# Patient Record
Sex: MALE | Race: WHITE
[De-identification: names, ages, dates, MRNs, and addresses within clinical notes are randomized per-mention and may not be internally consistent; named-entity substitution may affect disease eponyms.]

---

## 2017-09-22 ENCOUNTER — HOSPITAL ENCOUNTER (EMERGENCY)
Dept: HOSPITAL 25 - ED | Age: 32
Discharge: HOME | End: 2017-09-22
Payer: COMMERCIAL

## 2017-09-22 VITALS — DIASTOLIC BLOOD PRESSURE: 69 MMHG | SYSTOLIC BLOOD PRESSURE: 137 MMHG

## 2017-09-22 DIAGNOSIS — R10.9: ICD-10-CM

## 2017-09-22 DIAGNOSIS — R11.2: Primary | ICD-10-CM

## 2017-09-22 DIAGNOSIS — Z98.84: ICD-10-CM

## 2017-09-22 DIAGNOSIS — Z87.891: ICD-10-CM

## 2017-09-22 LAB
ALBUMIN SERPL BCG-MCNC: 4.1 G/DL (ref 3.2–5.2)
ALP SERPL-CCNC: 46 U/L (ref 34–104)
ALT SERPL W P-5'-P-CCNC: 9 U/L (ref 7–52)
AMYLASE SERPL-CCNC: 25 U/L (ref 29–103)
ANION GAP SERPL CALC-SCNC: 6 MMOL/L (ref 2–11)
AST SERPL-CCNC: (no result) U/L (ref 13–39)
BUN SERPL-MCNC: 13 MG/DL (ref 6–24)
BUN/CREAT SERPL: 17.6 (ref 8–20)
CALCIUM SERPL-MCNC: 9.6 MG/DL (ref 8.6–10.3)
CHLORIDE SERPL-SCNC: 104 MMOL/L (ref 101–111)
GLOBULIN SER CALC-MCNC: 2.7 G/DL (ref 2–4)
GLUCOSE SERPL-MCNC: 92 MG/DL (ref 70–100)
HCO3 SERPL-SCNC: 29 MMOL/L (ref 22–32)
HCT VFR BLD AUTO: 40 % (ref 42–52)
HGB BLD-MCNC: 13.6 G/DL (ref 14–18)
LIPASE SERPL-CCNC: 12 U/L (ref 11–82)
MCH RBC QN AUTO: 35 PG (ref 27–31)
MCHC RBC AUTO-ENTMCNC: 34 G/DL (ref 31–36)
MCV RBC AUTO: 101 FL (ref 80–94)
POTASSIUM SERPL-SCNC: (no result) MMOL/L (ref 3.5–5)
PROT SERPL-MCNC: 6.8 G/DL (ref 6.4–8.9)
RBC # BLD AUTO: 3.94 10^6/UL (ref 4–5.4)
SODIUM SERPL-SCNC: 139 MMOL/L (ref 133–145)
WBC # BLD AUTO: 4.8 10^3/UL (ref 3.5–10.8)

## 2017-09-22 PROCEDURE — 85025 COMPLETE CBC W/AUTO DIFF WBC: CPT

## 2017-09-22 PROCEDURE — 86140 C-REACTIVE PROTEIN: CPT

## 2017-09-22 PROCEDURE — 83690 ASSAY OF LIPASE: CPT

## 2017-09-22 PROCEDURE — 36415 COLL VENOUS BLD VENIPUNCTURE: CPT

## 2017-09-22 PROCEDURE — 74177 CT ABD & PELVIS W/CONTRAST: CPT

## 2017-09-22 PROCEDURE — 99284 EMERGENCY DEPT VISIT MOD MDM: CPT

## 2017-09-22 PROCEDURE — 96374 THER/PROPH/DIAG INJ IV PUSH: CPT

## 2017-09-22 PROCEDURE — 83605 ASSAY OF LACTIC ACID: CPT

## 2017-09-22 PROCEDURE — 82150 ASSAY OF AMYLASE: CPT

## 2017-09-22 PROCEDURE — 80053 COMPREHEN METABOLIC PANEL: CPT

## 2017-09-22 PROCEDURE — 96375 TX/PRO/DX INJ NEW DRUG ADDON: CPT

## 2017-09-22 RX ADMIN — SODIUM CHLORIDE ONE MLS/HR: 900 IRRIGANT IRRIGATION at 05:26

## 2017-09-22 RX ADMIN — SODIUM CHLORIDE ONE MLS/HR: 900 IRRIGANT IRRIGATION at 03:06

## 2017-09-22 NOTE — ED
Tereso SANDOVAL Benjamin, scribed for Lance Armendariz MD on 09/22/17 at 0217 .





Abdominal Pain/Male





- HPI Summary


HPI Summary: 





33yo male c/o sudden onset of LLQ for 2 weeks. Pt states that his pain radiates 

and wraps around his left flank. Pt also reports N/V, chills and states that he 

couldnt keep anything down. Pt had few episodes of diarrhea yesterday. Hx of 

diverticulitis 1 month ago and hx of gastric bypass surgery. 





- History of Current Complaint


Chief Complaint: EDAbdPain


Stated Complaint: RIGHT GROIN PAIN


Hx Obtained From: Patient


Onset/Duration: Sudden Onset, Lasting Weeks - 2 weeks, Still Present


Timing: Constant


Severity Initially: Mild


Severity Currently: Mild


Pain Intensity: 5


Pain Scale Used: 0-10 Numeric


Location: Discrete At: LUQ, Discrete At: LLQ


Radiates: Yes


Radiates to: Flank - left


Aggravating Factor(s): Nothing


Alleviating Factor(s): Nothing


Associated Signs And Symptoms: Positive: Nausea, Vomiting, Diarrhea, Other - 

chills





- Allergies/Home Medications


Allergies/Adverse Reactions: 


 Allergies











Allergy/AdvReac Type Severity Reaction Status Date / Time


 


Codeine Allergy  Difficulty Verified 09/22/17 00:24





   Breathing  














PMH/Surg Hx/FS Hx/Imm Hx


Endocrine/Hematology History: 


   Denies: Hx Anticoagulant Therapy, Hx Diabetes, Hx Thyroid Disease


Cardiovascular History: 


   Denies: Hx Congestive Heart Failure, Hx Hypertension, Hx Pacemaker/ICD


Respiratory History: Reports: Hx Asthma - SEASONAL, Hx Sleep Apnea


   Denies: Hx Chronic Obstructive Pulmonary Disease (COPD)


GI History: Reports: Hx Gastroesophageal Reflux Disease - ACID REFLUX, Other GI 

Disorders - HX OF DIVERTICULITIS-PRN PERCOCET FOR ABDOMINAL PAIN


   Denies: Hx Ulcer


 History: 


   Denies: Hx Dialysis, Hx Renal Disease


Musculoskeletal History: 


   Denies: Hx Arthritis


Sensory History: Reports: Hx Contacts or Glasses - GLASSES


   Denies: Hx Hearing Aid


Opthamlomology History: Reports: Hx Contacts or Glasses - GLASSES


Neurological History: Reports: Other Neuro Impairments/Disorders - NARCOLEPSY 

CONTROL WITH ADDERALL


   Denies: Hx Dementia, Hx Seizures


Psychiatric History: Reports: Hx Depression, Other Psychiatric Issues/Disorders 

- NARCOLEPSY


   Denies: Hx Panic Disorder, Hx Substance Abuse





- Surgical History


Surgery Procedure, Year, and Place: BILATERAL MYRINGOTOMY WITH TUBES X 7,.  

CIRCUMCISION AT AGE 9,.  TONSILLECTOMY AND ADENOIDECTOMY AS A CHILD.  

COLONOSCOPY.  LEFT WRIST surgery 9/17/13 - hardware placed.  GASTRIC SLEEVE 

SURGERY NOV 2016


Hx Anesthesia Reactions: No





- Immunization History


Date of Tetanus Vaccine: PT STATES UNSURE


Date of Influenza Vaccine: NONE


Infectious Disease History: No


Infectious Disease History: Reports: Hx Shingles - AS A CHILD


   Denies: Hx Clostridium Difficile, Hx Hepatitis, Hx Human Immunodeficiency 

Virus (HIV), Hx of Known/Suspected MRSA, Hx Tuberculosis, Hx Known/Suspected VRE

, Hx Known/Suspected VRSA, History Other Infectious Disease, Traveled Outside 

the [place] in Last 30 Days





- Family History


Known Family History: 


   Negative: Renal Disease, Seizure Disorder





- Social History


Occupation: Employed Part-time


Lives: Alone


Alcohol Use: Rare


Substance Use Type: Reports: None


Smoking Status (MU): Former Smoker


Type: Cigarettes


Amount Used/How Often: 1 PPD X 10+ YEARS


Have You Smoked in the Last Year: Yes





Review of Systems


Positive: Chills


Eyes: Negative


ENT: Negative


Cardiovascular: Negative


Respiratory: Negative


Positive: Abdominal Pain, Vomiting, Diarrhea, Nausea


Genitourinary: Negative


Positive: no symptoms reported


Musculoskeletal: Negative


Skin: Negative


Neurological: Negative


Psychological: Normal


All Other Systems Reviewed And Are Negative: Yes





Physical Exam





- Summary


Physical Exam Summary: 








The patient is well-nourished in mild acute distress.





The skin is warm and dry and skin color reflects adequate perfusion. Decresed 

skin turgor. 





HEENT:  The head is normocephalic and atraumatic. The pupils are equal and 

reactive. The conjunctivae are clear and without drainage.  Nares are patent 

and without drainage.  Mouth reveals dry mucous membranes and the throat is 

without erythema and exudate.  The external ears are intact. The ear canals are 

patent and without drainage. The tympanic membranes are intact.





Neck is supple with full range of motion and non-tender. There are no carotid 

bruits.  There is no neck vein distension.





Respiratory: Chest is non-tender.  Lungs are clear to auscultation and breath 

sounds are symmetrical and equal.





Cardiovascular: Hear is regular rate and rhythm.  There is no murmur or rub 

auscultated.   There is no peripheral edema and pulses are symmetrical and 

equal.





Abdomen: The abdomen is soft. Marked tenderness in LLQ, and LUQ. No tenderness 

in right sided abdomen.  There are normal bowel sounds heard in all four 

quadrants and there is no organomegaly palpated.





Musculoskeletal: There is no back pain noted.  Extremities are non-tender with 

full range of motion.  There is good capillary refill.  There is no peripheral 

edema or calf tenderness elicited.





Neurological: Patient is alert and oriented to person, place and time.  The 

patient has symmetrical motor strength in all four extremities.  Cranial nerves 

are grossly intact. Deep tendon reflexes are symmetrical and equal in all four 

extremities.





Psychiatric: The patient has an appropriate affect and does not exhibit any 

anxiety or depression. 





Triage Information Reviewed: Yes


Vital Signs On Initial Exam: 


 Initial Vitals











Temp Pulse Resp BP Pulse Ox


 


 97.3 F   68   16   122/64   100 


 


 09/22/17 00:21  09/22/17 00:21  09/22/17 00:21  09/22/17 00:21  09/22/17 00:21











Vital Signs Reviewed: Yes





Diagnostics





- Vital Signs


 Vital Signs











  Temp Pulse Resp BP Pulse Ox


 


 09/22/17 00:21  97.3 F  68  16  122/64  100














- Laboratory


Lab Results: 


 Lab Results











  09/22/17 09/22/17 09/22/17 Range/Units





  02:40 02:40 02:40 


 


WBC   4.8   (3.5-10.8)  10^3/ul


 


RBC   3.94 L   (4.0-5.4)  10^6/ul


 


Hgb   13.6 L   (14.0-18.0)  g/dl


 


Hct   40 L   (42-52)  %


 


MCV   101 H   (80-94)  fL


 


MCH   35 H   (27-31)  pg


 


MCHC   34   (31-36)  g/dl


 


RDW   14   (10.5-15)  %


 


Plt Count   114 L   (150-450)  10^3/ul


 


MPV   9   (7.4-10.4)  um3


 


Neut % (Auto)   66.9   (38-83)  %


 


Lymph % (Auto)   24.7 L   (25-47)  %


 


Mono % (Auto)   5.9   (1-9)  %


 


Eos % (Auto)   1.6   (0-6)  %


 


Baso % (Auto)   0.9   (0-2)  %


 


Absolute Neuts (auto)   3.2   (1.5-7.7)  10^3/ul


 


Absolute Lymphs (auto)   1.2   (1.0-4.8)  10^3/ul


 


Absolute Monos (auto)   0.3   (0-0.8)  10^3/ul


 


Absolute Eos (auto)   0.1   (0-0.6)  10^3/ul


 


Absolute Basos (auto)   0   (0-0.2)  10^3/ul


 


Absolute Nucleated RBC   0.01   10^3/ul


 


Nucleated RBC %   0.1   


 


Sodium  139    (133-145)  mmol/L


 


Potassium  TNP    


 


Chloride  104    (101-111)  mmol/L


 


Carbon Dioxide  29    (22-32)  mmol/L


 


Anion Gap  6    (2-11)  mmol/L


 


BUN  13    (6-24)  mg/dL


 


Creatinine  0.74    (0.67-1.17)  mg/dL


 


Est GFR ( Amer)  156.7    (>60)  


 


Est GFR (Non-Af Amer)  121.8    (>60)  


 


BUN/Creatinine Ratio  17.6    (8-20)  


 


Glucose  92    ()  mg/dL


 


Lactic Acid    0.7  (0.5-2.0)  mmol/L


 


Calcium  9.6    (8.6-10.3)  mg/dL


 


Total Bilirubin  0.70    (0.2-1.0)  mg/dL


 


AST  TNP    


 


ALT  9    (7-52)  U/L


 


Alkaline Phosphatase  46    ()  U/L


 


C-Reactive Protein  5.79 H    (< 5.00)  mg/L


 


Total Protein  6.8    (6.4-8.9)  g/dL


 


Albumin  4.1    (3.2-5.2)  g/dL


 


Globulin  2.7    (2-4)  g/dL


 


Albumin/Globulin Ratio  1.5    (1-3)  


 


Amylase  25 L    ()  U/L


 


Lipase  12    (11.0-82.0)  U/L











Result Diagrams: 


 09/22/17 02:40





 09/22/17 02:40


Lab Statement: Any lab studies that have been ordered have been reviewed, and 

results considered in the medical decision making process.





- CT


  ** CT A/P W


CT Interpretation: No Acute Changes


CT Interpretation Completed By: Radiologist - ED physician has reviewed the 

radiology report and agrees with the finding.





Re-Evaluation





- Re-Evaluation


  ** First Eval


Re-Evaluation Time: 06:18


Comment: Reviewed pt's lab and imaging results. Discussed pt's discharge and 

follow up plans.





Abdominal Pain Fem Course/Dx





- Course


Course Of Treatment: Reviewed pt's medication list and allergies. Blood 

pressure noted.





- Diagnoses


Differential Diagnosis/HQI/PQRI: Bowel Obstruction, Constipation, Diverticulitis

, Pancreatitis, Renal Colic


Provider Diagnoses: 


 Abdominal pain, History of gastric bypass








Discharge





- Discharge Plan


Condition: Stable


Disposition: HOME


Prescriptions: 


oxyCODONE/Acetamin 5/325 MG* [Percocet 5/325 TAB*] 1 tab PO Q6H PRN #20 tab MDD 

4


 PRN Reason: pain


Patient Education Materials:  Abdominal Pain (ED), Oxycodone/Acetaminophen (By 

mouth)


Referrals: 


Michoacano Good MD [Medical Doctor] -  (PLEASE FOLLOW UP WITH DR. GOOD.)


Brina Garcia MD [Primary Care Provider] - 





The documentation as recorded by the Tereso dang Benjamin accurately reflects 

the service I personally performed and the decisions made by me, Lance Armendariz MD.

## 2017-09-22 NOTE — RAD
CLINICAL HISTORY: Left upper quadrant pain, left lower quadrant pain, colitis, small bowel

obstruction



COMPARISON: December 29, 2015



TECHNIQUE: Multiple contiguous axial CT scans were obtained of the abdomen and pelvis

after the administration of intravenous contrast. Coronal and sagittal multiplanar

reformations are submitted for review.  Oral contrast was administered.  Delayed images

were obtained through the abdomen and pelvis.



FINDINGS:    



LUNG BASES: The lung bases are clear.



LIVER: The liver is diffusely low in attenuation compared to the spleen. There are no

focal hepatic parenchymal masses.

BILE DUCTS: There is no intrahepatic or extrahepatic biliary dilatation.

GALLBLADDER: The gallbladder is normal, without pericholecystic inflammatory change.



PANCREAS: The pancreas is normal, without mass or ductal dilatation.

SPLEEN: The spleen is at the upper limits of normal in size, measuring up to 12.6 cm in

length and 5 cm in thickness.



UPPER GI TRACT: Evaluation of the gastrointestinal tract is limited by incomplete gastric

distention. There is postsurgical change to the upper GI tract.

SMALL BOWEL AND MESENTERY: The small bowel is normal in contour, course, and caliber.

There is no obstruction or dilatation.

COLON: There are multiple diverticula of the descending and sigmoid colon. There is no

pericolonic inflammatory change. There is a tubular, vermiform, hollow viscus that is

blind ending, and originates from the cecum, consistent with a normal appendix. There is

no periappendiceal inflammatory change. This is seen on coronal images 32 through 39.



ADRENALS: Normal bilaterally.

KIDNEYS: The kidneys are normal in shape, size, contour, and axis. There is no

hydronephrosis or nephrolithiasis.

BLADDER: The bladder is smooth in contour.



PELVIC ORGANS: The prostate gland is normal. The seminal vesicles are symmetric.



AORTA: The aorta is normal.

IVC: Unremarkable



LYMPH NODES: There is no lymphadenopathy by size criteria.



ABDOMINAL WALL: There is no evidence for abdominal wall hernia.

BONES AND SOFT TISSUES: There are bilateral pars defects at L5. There is mild subcutaneous

edema.

OTHER: There is a trace amount of pelvic ascites.



IMPRESSION:

1.  TRACE PELVIC ASCITES, WITH MILD SUBCUTANEOUS EDEMA.

2.  DIVERTICULOSIS.

3.  POSTSURGICAL CHANGE TO THE UPPER GI TRACT.

4.  FATTY LIVER.

5.  THE SPLEEN IS AT THE UPPER LIMITS OF NORMAL IN SIZE.

6.  BILATERAL SPONDYLOLYSIS OF L5

## 2017-09-30 ENCOUNTER — HOSPITAL ENCOUNTER (EMERGENCY)
Dept: HOSPITAL 25 - ED | Age: 32
Discharge: HOME | End: 2017-09-30
Payer: COMMERCIAL

## 2017-09-30 VITALS — DIASTOLIC BLOOD PRESSURE: 66 MMHG | SYSTOLIC BLOOD PRESSURE: 128 MMHG

## 2017-09-30 DIAGNOSIS — Z88.5: ICD-10-CM

## 2017-09-30 DIAGNOSIS — R10.32: Primary | ICD-10-CM

## 2017-09-30 DIAGNOSIS — Z87.891: ICD-10-CM

## 2017-09-30 DIAGNOSIS — R60.9: ICD-10-CM

## 2017-09-30 DIAGNOSIS — F32.9: ICD-10-CM

## 2017-09-30 DIAGNOSIS — R16.0: ICD-10-CM

## 2017-09-30 DIAGNOSIS — K76.0: ICD-10-CM

## 2017-09-30 DIAGNOSIS — K21.9: ICD-10-CM

## 2017-09-30 DIAGNOSIS — Z98.84: ICD-10-CM

## 2017-09-30 LAB
ALBUMIN SERPL BCG-MCNC: 4 G/DL (ref 3.2–5.2)
ALP SERPL-CCNC: 43 U/L (ref 34–104)
ALT SERPL W P-5'-P-CCNC: 12 U/L (ref 7–52)
ANION GAP SERPL CALC-SCNC: 4 MMOL/L (ref 2–11)
AST SERPL-CCNC: 11 U/L (ref 13–39)
BUN SERPL-MCNC: 13 MG/DL (ref 6–24)
BUN/CREAT SERPL: 16.9 (ref 8–20)
CALCIUM SERPL-MCNC: 9 MG/DL (ref 8.6–10.3)
CHLORIDE SERPL-SCNC: 105 MMOL/L (ref 101–111)
GLOBULIN SER CALC-MCNC: 2.6 G/DL (ref 2–4)
GLUCOSE SERPL-MCNC: 83 MG/DL (ref 70–100)
HCO3 SERPL-SCNC: 29 MMOL/L (ref 22–32)
HCT VFR BLD AUTO: 39 % (ref 42–52)
HGB BLD-MCNC: 13.5 G/DL (ref 14–18)
LIPASE SERPL-CCNC: 12 U/L (ref 11–82)
MCH RBC QN AUTO: 34 PG (ref 27–31)
MCHC RBC AUTO-ENTMCNC: 34 G/DL (ref 31–36)
MCV RBC AUTO: 100 FL (ref 80–94)
POTASSIUM SERPL-SCNC: 4.2 MMOL/L (ref 3.5–5)
PROT SERPL-MCNC: 6.6 G/DL (ref 6.4–8.9)
RBC # BLD AUTO: 3.95 10^6/UL (ref 4–5.4)
SODIUM SERPL-SCNC: 138 MMOL/L (ref 133–145)
WBC # BLD AUTO: 4.3 10^3/UL (ref 3.5–10.8)

## 2017-09-30 PROCEDURE — 74177 CT ABD & PELVIS W/CONTRAST: CPT

## 2017-09-30 PROCEDURE — 83605 ASSAY OF LACTIC ACID: CPT

## 2017-09-30 PROCEDURE — 83690 ASSAY OF LIPASE: CPT

## 2017-09-30 PROCEDURE — 99285 EMERGENCY DEPT VISIT HI MDM: CPT

## 2017-09-30 PROCEDURE — 96375 TX/PRO/DX INJ NEW DRUG ADDON: CPT

## 2017-09-30 PROCEDURE — 36415 COLL VENOUS BLD VENIPUNCTURE: CPT

## 2017-09-30 PROCEDURE — 86140 C-REACTIVE PROTEIN: CPT

## 2017-09-30 PROCEDURE — 96376 TX/PRO/DX INJ SAME DRUG ADON: CPT

## 2017-09-30 PROCEDURE — 96360 HYDRATION IV INFUSION INIT: CPT

## 2017-09-30 PROCEDURE — 85025 COMPLETE CBC W/AUTO DIFF WBC: CPT

## 2017-09-30 PROCEDURE — 80053 COMPREHEN METABOLIC PANEL: CPT

## 2017-09-30 NOTE — RAD
INDICATION: Left lower quadrant pain     



COMPARISON: CT September 20, 2017

 

TECHNIQUE: Axial source images were obtained from the hemidiaphragms to the symphysis

pubis following administration of oral and intravenous contrast.  100 mL Omnipaque 300 was

utilized. Coronal and sagittal reconstructed images were acquired.



Lung bases: The lung bases are clear.



Liver: The liver is enlarged with findings of hepatic steatosis. There are no masses.

There is no ductal dilatation.



Gallbladder: There are no calcified gallstones. There is no evidence of wall thickening or

pericholecystic fluid.



Spleen: The spleen is mildly enlarged. There are no masses.



Pancreas: There is no focal pancreatic mass or ductal dilatation.



Adrenal glands: There is no evidence of adrenal mass.



Kidneys: The kidneys are normal in size and position. There are prompt nephrograms and

there is prompt excretion bilaterally. There are no renal parenchymal masses. There is no

evidence of nephrolithiasis.



Adenopathy: There is no evidence of adenopathy by size criteria.



Fluid collections: There is diffuse subcutaneous and mesenteric edema. This appears

slightly more pronounced



Vessels:There are no significant atherosclerotic changes involving the aorta. There is no

focal aneurysm. The iliac vessels are normal in caliber. The IVC appears normal.



GI tract: There are postoperative changes to the upper GI tract. The small bowel to

include the terminal ileum are normal. The ileocecal valve is normal. The appendix is

normal. There are scattered diverticula of the sigmoid colon but there is no CT evidence

of acute diverticulitis.



Pelvic organs: The prostate and seminal vesicles appear normal



Bladder: There are no bladder masses.



Abdominal and pelvic soft tissues: Subcutaneous edema as noted above.



Osseous structures: There are no acute osseous findings. There is L5 spondylolysis.



Other: None



IMPRESSION:

1.  Hepatomegaly with hepatic steatosis. Mild splenomegaly.

2.  Presumed bariatric surgery.

3.  Scattered diverticula of the sigmoid colon but no CT evidence of acute diverticulitis.

4.  Subcutaneous and mesenteric edema. The significance is uncertain.

## 2017-10-03 ENCOUNTER — HOSPITAL ENCOUNTER (INPATIENT)
Dept: HOSPITAL 25 - ED | Age: 32
LOS: 2 days | Discharge: HOME | DRG: 244 | End: 2017-10-05
Attending: HOSPITALIST | Admitting: HOSPITALIST
Payer: COMMERCIAL

## 2017-10-03 DIAGNOSIS — Z80.9: ICD-10-CM

## 2017-10-03 DIAGNOSIS — I10: ICD-10-CM

## 2017-10-03 DIAGNOSIS — K21.9: ICD-10-CM

## 2017-10-03 DIAGNOSIS — M47.896: ICD-10-CM

## 2017-10-03 DIAGNOSIS — Z83.3: ICD-10-CM

## 2017-10-03 DIAGNOSIS — Z83.79: ICD-10-CM

## 2017-10-03 DIAGNOSIS — G47.419: ICD-10-CM

## 2017-10-03 DIAGNOSIS — K57.32: Primary | ICD-10-CM

## 2017-10-03 DIAGNOSIS — Z87.891: ICD-10-CM

## 2017-10-03 DIAGNOSIS — R00.1: ICD-10-CM

## 2017-10-03 DIAGNOSIS — Z88.6: ICD-10-CM

## 2017-10-03 DIAGNOSIS — Z98.84: ICD-10-CM

## 2017-10-03 DIAGNOSIS — J45.909: ICD-10-CM

## 2017-10-03 DIAGNOSIS — F32.9: ICD-10-CM

## 2017-10-03 DIAGNOSIS — E66.9: ICD-10-CM

## 2017-10-03 DIAGNOSIS — R16.2: ICD-10-CM

## 2017-10-03 DIAGNOSIS — G47.33: ICD-10-CM

## 2017-10-03 DIAGNOSIS — Z88.5: ICD-10-CM

## 2017-10-03 DIAGNOSIS — M43.17: ICD-10-CM

## 2017-10-03 LAB
ALBUMIN SERPL BCG-MCNC: 4.2 G/DL (ref 3.2–5.2)
ALP SERPL-CCNC: 50 U/L (ref 34–104)
ALT SERPL W P-5'-P-CCNC: 14 U/L (ref 7–52)
AMYLASE SERPL-CCNC: 37 U/L (ref 29–103)
ANION GAP SERPL CALC-SCNC: 4 MMOL/L (ref 2–11)
AST SERPL-CCNC: 12 U/L (ref 13–39)
BUN SERPL-MCNC: 13 MG/DL (ref 6–24)
BUN/CREAT SERPL: 18.6 (ref 8–20)
CALCIUM SERPL-MCNC: 9.4 MG/DL (ref 8.6–10.3)
CHLORIDE SERPL-SCNC: 105 MMOL/L (ref 101–111)
CK SERPL-CCNC: 35 U/L (ref 10–223)
GLOBULIN SER CALC-MCNC: 2.5 G/DL (ref 2–4)
GLUCOSE SERPL-MCNC: 101 MG/DL (ref 70–100)
HCO3 SERPL-SCNC: 30 MMOL/L (ref 22–32)
HCT VFR BLD AUTO: 42 % (ref 42–52)
HGB BLD-MCNC: 14.1 G/DL (ref 14–18)
LIPASE SERPL-CCNC: 19 U/L (ref 11–82)
MAGNESIUM SERPL-MCNC: 1.9 MG/DL (ref 1.9–2.7)
MCH RBC QN AUTO: 34 PG (ref 27–31)
MCHC RBC AUTO-ENTMCNC: 34 G/DL (ref 31–36)
MCV RBC AUTO: 102 FL (ref 80–94)
POTASSIUM SERPL-SCNC: 4 MMOL/L (ref 3.5–5)
PROT SERPL-MCNC: 6.7 G/DL (ref 6.4–8.9)
RBC # BLD AUTO: 4.11 10^6/UL (ref 4–5.4)
SODIUM SERPL-SCNC: 139 MMOL/L (ref 133–145)
WBC # BLD AUTO: 3.6 10^3/UL (ref 3.5–10.8)

## 2017-10-03 PROCEDURE — 85610 PROTHROMBIN TIME: CPT

## 2017-10-03 PROCEDURE — 81003 URINALYSIS AUTO W/O SCOPE: CPT

## 2017-10-03 PROCEDURE — 86140 C-REACTIVE PROTEIN: CPT

## 2017-10-03 PROCEDURE — 82150 ASSAY OF AMYLASE: CPT

## 2017-10-03 PROCEDURE — 87040 BLOOD CULTURE FOR BACTERIA: CPT

## 2017-10-03 PROCEDURE — 80048 BASIC METABOLIC PNL TOTAL CA: CPT

## 2017-10-03 PROCEDURE — 85025 COMPLETE CBC W/AUTO DIFF WBC: CPT

## 2017-10-03 PROCEDURE — 83690 ASSAY OF LIPASE: CPT

## 2017-10-03 PROCEDURE — 83605 ASSAY OF LACTIC ACID: CPT

## 2017-10-03 PROCEDURE — 93005 ELECTROCARDIOGRAM TRACING: CPT

## 2017-10-03 PROCEDURE — G0378 HOSPITAL OBSERVATION PER HR: HCPCS

## 2017-10-03 PROCEDURE — 80053 COMPREHEN METABOLIC PANEL: CPT

## 2017-10-03 PROCEDURE — 74177 CT ABD & PELVIS W/CONTRAST: CPT

## 2017-10-03 PROCEDURE — 83735 ASSAY OF MAGNESIUM: CPT

## 2017-10-03 PROCEDURE — 82550 ASSAY OF CK (CPK): CPT

## 2017-10-03 PROCEDURE — 36415 COLL VENOUS BLD VENIPUNCTURE: CPT

## 2017-10-03 RX ADMIN — HEPARIN SODIUM SCH UNITS: 5000 INJECTION INTRAVENOUS; SUBCUTANEOUS at 22:08

## 2017-10-03 RX ADMIN — OXYCODONE HYDROCHLORIDE AND ACETAMINOPHEN PRN TAB: 5; 325 TABLET ORAL at 20:05

## 2017-10-03 RX ADMIN — HYDROMORPHONE HYDROCHLORIDE PRN MG: 1 INJECTION, SOLUTION INTRAMUSCULAR; INTRAVENOUS; SUBCUTANEOUS at 22:07

## 2017-10-03 RX ADMIN — ONDANSETRON PRN MG: 2 INJECTION INTRAMUSCULAR; INTRAVENOUS at 17:27

## 2017-10-03 RX ADMIN — SODIUM CHLORIDE SCH MLS/HR: 900 IRRIGANT IRRIGATION at 14:59

## 2017-10-03 RX ADMIN — HYDROMORPHONE HYDROCHLORIDE PRN MG: 1 INJECTION, SOLUTION INTRAMUSCULAR; INTRAVENOUS; SUBCUTANEOUS at 17:27

## 2017-10-03 RX ADMIN — ONDANSETRON PRN MG: 2 INJECTION INTRAMUSCULAR; INTRAVENOUS at 23:10

## 2017-10-03 RX ADMIN — PIPERACILLIN AND TAZOBACTAM SCH MLS/HR: 3; .375 INJECTION, POWDER, LYOPHILIZED, FOR SOLUTION INTRAVENOUS; PARENTERAL at 16:14

## 2017-10-03 RX ADMIN — HEPARIN SODIUM SCH: 5000 INJECTION INTRAVENOUS; SUBCUTANEOUS at 14:27

## 2017-10-03 RX ADMIN — OXYCODONE HYDROCHLORIDE AND ACETAMINOPHEN PRN TAB: 5; 325 TABLET ORAL at 14:51

## 2017-10-03 RX ADMIN — HYDROMORPHONE HYDROCHLORIDE PRN MG: 1 INJECTION, SOLUTION INTRAMUSCULAR; INTRAVENOUS; SUBCUTANEOUS at 12:14

## 2017-10-03 RX ADMIN — DEXTROAMPHETAMINE SACCHARATE, AMPHETAMINE ASPARTATE, DEXTROAMPHETAMINE SULFATE AND AMPHETAMINE SULFATE SCH MG: 2.5; 2.5; 2.5; 2.5 TABLET ORAL at 16:14

## 2017-10-03 NOTE — ED
Damián SANDOVAL Angela, scribed for Dennis Cat MD on 10/03/17 at 0746 .





Abdominal Pain/Male





- HPI Summary


HPI Summary: 





This pt is a 31 y[facility]. Pt 

reports he was in the ED 3 days ago, where he had a CT done and was discharged 

home with Cipro and Flagyl for diverticulitis. Pt notes his abd pain starts in 

his umbilicus and radiates to his left abd. Pt additionally c/o nausea and 

diarrhea. Pt notes he had a fever last night at 2100 and experienced chills. Pt 

denies tobacco, alcohol or drug use. He works in [employer] in the [email or web address] 

department. PMhx: diverticulitis. PSHx: gastric sleeve surgery in Nov. 2016.





- History of Current Complaint


Chief Complaint: EDAbdPain


Stated Complaint: ABD PAIN


Time Seen by Provider: 10/03/17 07:15


Hx Obtained From: Patient


Onset/Duration: Lasting Hours


Timing: Lasting Hours


Pain Intensity: 9


Pain Scale Used: 0-10 Numeric


Location: Umbilical


Radiates: Yes


Radiates to: LLQ - left sided


Associated Signs And Symptoms: Positive: Fever, Back Pain, Nausea, Diarrhea.  

Negative: Chest Pain, Constipation





- Allergies/Home Medications


Allergies/Adverse Reactions: 


 Allergies











Allergy/AdvReac Type Severity Reaction Status Date / Time


 


Codeine Allergy Severe Difficulty Verified 10/03/17 05:30





   Breathing  


 


NSAIDs Allergy Severe GI Upset Verified 10/03/17 05:30














PMH/Surg Hx/FS Hx/Imm Hx


Endocrine/Hematology History: 


   Denies: Hx Anticoagulant Therapy, Hx Diabetes, Hx Thyroid Disease


Cardiovascular History: 


   Denies: Hx Congestive Heart Failure, Hx Hypertension, Hx Pacemaker/ICD


Respiratory History: Reports: Hx Asthma - SEASONAL, Hx Sleep Apnea


   Denies: Hx Chronic Obstructive Pulmonary Disease (COPD)


GI History: Reports: Hx Gastroesophageal Reflux Disease - ACID REFLUX, Other GI 

Disorders - HX OF DIVERTICULITIS-PRN PERCOCET FOR ABDOMINAL PAIN


   Denies: Hx Ulcer


 History: 


   Denies: Hx Dialysis, Hx Renal Disease


Musculoskeletal History: 


   Denies: Hx Arthritis


Sensory History: Reports: Hx Contacts or Glasses - GLASSES


   Denies: Hx Hearing Aid


Opthamlomology History: Reports: Hx Contacts or Glasses - GLASSES


Neurological History: Reports: Other Neuro Impairments/Disorders - NARCOLEPSY 

CONTROL WITH ADDERALL


   Denies: Hx Dementia, Hx Seizures


Psychiatric History: Reports: Hx Depression, Other Psychiatric Issues/Disorders 

- NARCOLEPSY


   Denies: Hx Panic Disorder, Hx Substance Abuse





- Surgical History


Surgery Procedure, Year, and Place: BILATERAL MYRINGOTOMY WITH TUBES X 7,.  

CIRCUMCISION AT AGE 9,.  TONSILLECTOMY AND ADENOIDECTOMY AS A CHILD.  

COLONOSCOPY.  LEFT WRIST surgery 9/17/13 - hardware placed.  GASTRIC SLEEVE 

SURGERY NOV 2016


Hx Anesthesia Reactions: No





- Immunization History


Date of Tetanus Vaccine: PT STATES UNSURE


Date of Influenza Vaccine: NONE


Infectious Disease History: No


Infectious Disease History: Reports: Hx Shingles - AS A CHILD


   Denies: Hx Clostridium Difficile, Hx Hepatitis, Hx Human Immunodeficiency 

Virus (HIV), Hx of Known/Suspected MRSA, Hx Tuberculosis, Hx Known/Suspected VRE

, Hx Known/Suspected VRSA, History Other Infectious Disease, Traveled Outside 

the [place] in Last 30 Days





- Family History


Known Family History: Positive: Diabetes - Father, Other - Father: 

diverticulitis, Crohn's


   Negative: Renal Disease, Seizure Disorder





- Social History


Occupation: Employed Full-time - [employer]


Alcohol Use: Rare


Substance Use Type: Reports: None


Smoking Status (MU): Former Smoker


Type: Cigarettes


Amount Used/How Often: 1 PPD X 10+ YEARS


Have You Smoked in the Last Year: Yes





Review of Systems


Negative: Fever, Chills


Negative: Palpitations, Chest Pain


Negative: Shortness Of Breath


Positive: Abdominal Pain - left sided, Diarrhea, Nausea.  Negative: Vomiting


All Other Systems Reviewed And Are Negative: Yes





Physical Exam


Triage Information Reviewed: Yes


Vital Signs On Initial Exam: 


 Initial Vitals











Temp Pulse Resp BP Pulse Ox


 


 97.6 F   56   16   130/82   98 


 


 10/03/17 04:54  10/03/17 04:54  10/03/17 04:54  10/03/17 04:54  10/03/17 04:54











Vital Signs Reviewed: Yes


Appearance: Positive: Well-Appearing, No Pain Distress


Skin: Positive: Skin Color Reflects Adequate Perfusion


Head/Face: Positive: Normal Head/Face Inspection


Eyes: Positive: EOMI


Neck: Positive: Supple, Nontender


Respiratory/Lung Sounds: Positive: Clear to Auscultation, Breath Sounds Present


Cardiovascular: Positive: RRR.  Negative: Murmur


Abdomen Description: Positive: Other: - tender across the anterior abdominal 

wall


Musculoskeletal: Positive: Strength/ROM Intact


Neurological: Positive: Sensory/Motor Intact, Alert, Oriented to Person Place, 

Time, CN Intact II-III


Psychiatric: Positive: Normal





- Danielle Coma Scale


Best Eye Response: 4 - Spontaneous


Best Motor Response: 6 - Obeys Commands


Best Verbal Response: 5 - Oriented


Coma Scale Total: 15





Diagnostics





- Vital Signs


 Vital Signs











  Temp Pulse Resp BP Pulse Ox


 


 10/03/17 04:54  97.6 F  56  16  130/82  98














- Laboratory


Result Diagrams: 


 10/03/17 08:03





 10/03/17 08:03


Lab Statement: Any lab studies that have been ordered have been reviewed, and 

results considered in the medical decision making process.





- CT


  ** Abd/Pel CT


CT Interpretation: Positive (See Comments) - IMPRESSION:   1. MILD THICKENING 

OF THE WALL OF THE PROXIMAL SIGMOID COLON DIFFERENTIAL DIAGNOSIS WOULD INCLUDE 

MUSCULAR HYPERTROPHY FROM SEVERE SIGMOID DIVERTICULOSIS, MILD DIVERTICULITIS OR 

COLITIS, LESS LIKELY A COLON MASS. 2. SUBCUTANEOUS AND MESENTERIC EDEMA, 

UNCHANGED. 3. HEPATOSPLENOMEGALY, UNCHANGED. 4. STATUS POST GASTRIC SLEEVE 

PROCEDURE. 5. MILD GRADE I ANTERIOR SPONDYLOLISTHESIS AT THE L5-S1 LEVEL AND 

BILATERAL SPONDYLOLYSIS AT THE L5 LEVEL, UNCHANGED. ED physician has reviewed 

this radiology report and agrees.


CT Interpretation Completed By: Radiologist





Abdominal Pain Fem Course/Dx





- Course


Course Of Treatment: 32 yr old male who has not improved onoutpaitent 

treatment.  Admit for IV antibiotics.





- Diagnoses


Provider Diagnoses: 


 Diverticulitis large intestine, Abdominal pain








- Provider Notifications


Discussed Care Of Patient With: Stephanie Beth


Time Discussed With Above Provider: 11:25





Discharge





- Discharge Plan


Condition: Good


Disposition: ADMITTED TO [facility]





The documentation as recorded by the Damián dang Angela accurately reflects 

the service I personally performed and the decisions made by Emory spears Walter, MD.

## 2017-10-03 NOTE — RAD
INDICATION:  Persistent abdominal pain worsening.



COMPARISON:  Comparison is made with prior studies from September 22, 2017 and September 30, 2017.



TECHNIQUE: A CT scan of the abdomen and pelvis was performed with intravenous and oral

contrast following intravenous injection of 117 ml of  Omnipaque 300 nonionic contrast.

Contiguous axial sections were obtained from the lung bases through the symphysis pubis.

Images were reconstructed in the coronal and sagittal planes.



FINDINGS:  The lung bases are clear.  No pleural effusion is present.



The liver and spleen are moderately enlarged without significant focal abnormality and

unchanged from the prior study. No calcified gallstones are seen. The pancreas appears to

be within normal limits. 



The kidneys and adrenal glands are normal in size. No hydronephrosis is seen.  No

significant focal renal abnormality is seen.



The aorta is normal in caliber and demonstrates homogeneous contrast opacification.



No significant enlarged retroperitoneal lymph nodes are seen.



The patient is status post gastric sleeve procedure. The stomach, small and large bowel

appear nondistended. The appendix is within normal limits.  There is moderate to severe

descending and sigmoid diverticulosis. There is mild thickening of the wall of the

proximal sigmoid colon appears similar to the prior study which may represent muscular

hypertrophy although mild diverticulitis be excluded. There is mild stranding within the

mesenteric fat and within the subcutaneous tissues which appears unchanged from the prior

study.



No free intraperitoneal air or fluid is seen.



There is minimal grade I anterior spondylolisthesis at the L5-S1 level and bilateral

spondylolysis at the L5 level. No other focal osseous abnormalities are seen.



IMPRESSION:  

1. MILD THICKENING OF THE WALL OF THE PROXIMAL SIGMOID COLON DIFFERENTIAL DIAGNOSIS WOULD

INCLUDE MUSCULAR HYPERTROPHY FROM SEVERE SIGMOID DIVERTICULOSIS, MILD DIVERTICULITIS OR

COLITIS, LESS LIKELY A COLON MASS.

2. SUBCUTANEOUS AND MESENTERIC EDEMA, UNCHANGED.

3. HEPATOSPLENOMEGALY, UNCHANGED.

4. STATUS POST GASTRIC SLEEVE PROCEDURE.

5. MILD GRADE I ANTERIOR SPONDYLOLISTHESIS AT THE L5-S1 LEVEL AND BILATERAL SPONDYLOLYSIS

AT THE L5 LEVEL, UNCHANGED.

## 2017-10-03 NOTE — HP
CC:  Dr. Good *

 

HISTORY AND PHYSICAL:

 

DATE OF ADMISSION:  10/03/17

 

ATTENDING PHYSICIAN WHILE IN THE HOSPITAL:  Jacky Alvarez MD* (report dictated 
by Tyrese Jacob NP)

 

CHIEF COMPLAINT:  Left lower quadrant abdominal pain.

 

HISTORY OF PRESENTING ILLNESS:  Mr. Royal is a 32-year-old male patient who 
has had diverticulitis multiple times, he states it has been too numerous to 
count.  He is actually set up for sigmoid colon resection with Dr. Good in 
about a month's time.  He came in today, he stated on Thursday and Friday he 
started developing left lower colicky type burning, stabbing pain similar to 
his diverticulitis.  He came into the ER on Saturday, was prescribed 
antibiotics.  However, despite the antibiotics being on board for about 36 hours
, he still spiked a fever.  He was nauseous on Sunday. His pain was getting 
worse yesterday, progressively got worse today.  He was concerned and decided 
to come into the ER.  He says he has not been vomiting but he says he has been 
feeling very nauseous, having hard time taking his medications.  He says his 
pain has been well controlled and he described it as burning, sharp, stabbing 
pain in the left lower quadrant that comes and goes and there has been no 
association with food.  He does state he has been having some diarrhea.  He 
denies having any blood in the urine or in the stool and denies having any 
urgency, frequency or hesitancy.  He came into the ED, there was concern 
because of the diverticulitis and we were asked to evaluate for admission.

 

PAST MEDICAL HISTORY:  Significant for,

1.  Obesity.

2.  Hypertension.

3.  LUDA.

4.  Narcolepsy.

5.  Diverticulitis and diverticulosis.

 

PAST SURGICAL HISTORY:  He has had a gastric sleeve about a year ago.  He has 
lost 200 pounds.

 

HOME MEDICATIONS:  According to the list that he provided and includes,

 

1.  Percocet one tablet every 4 to 6 hours as needed.

2.  Zofran 4 mg every 6 hours as needed.

3.  Flagyl 500 mg p.o. t.i.d.

4.  Cipro 500 mg p.o. b.i.d.

5.  Adderall 20 mg p.o. t.i.d.

 

ALLERGIES TO MEDICATIONS:  CODEINE and NSAIDs.

 

FAMILY HISTORY:  His mother had cancer.  His father had history of 
diverticulitis, Crohn's probable and diabetes.

 

SOCIAL HISTORY:  He is a former smoker, he quit about a year ago.  He does not 
drink alcohol.  He does not wish to appoint a surrogate decision maker at this 
point.

 

REVIEW OF SYSTEMS:  There was fever at home but none documented here.  He 
denied any significant weight change.  There was no double vision.  He denies 
having any ear discharge.  There was no rhinorrhea.  No sore throat.  No 
thyroid enlargement. There was no chest pain, orthopnea or nocturnal dyspnea.  
Abdominal pain per my HPI.  There was nausea but no vomiting.  No dysuria.  No 
frequency.  No seizure. No loss of consciousness.  No pruritus and no skin 
ulcerations.  Review of 14 systems completed, all others negative.

 

                               PHYSICAL EXAMINATION

 

GENERAL:  At this time, Mr. Royal is a 32-year-old male patient.  He is 
sitting in the ER stretcher.  He does not appear to be in any acute distress.

 

VITAL SIGNS:  Blood pressure 128/78, pulse of 63, respirations 16, O2 sat 98%, 
temperature 97.6.

 

HEENT:  Head:  Atraumatic and normocephalic.  Eyes:  EOMs are intact.  Sclerae 
anicteric and not pale.  Throat:  Oral mucosa appears to be dry.  No 
oropharyngeal erythema.

 

NECK:  Supple.

 

LUNGS:  Clear to auscultation bilaterally.  No wheezes, rales or rhonchi.

 

HEART:  Sounds S1 and S2.  Regular rate and rhythm.  No murmurs, rubs, or 
gallops.

 

ABDOMEN:  Soft, flat, tenderness in the left lower quadrant.  Bowel sounds 
present.

 

EXTREMITIES:  Pulses are 2+ throughout.  He is able to move all 4 extremities 
with 5/5 strength.

 

NEUROLOGIC:  The patient is awake, alert and oriented x3.  Tongue midline.  
 are equal.  No gross focal deficits.

 

SKIN:  Grossly intact.

 

 DIAGNOSTIC STUDIES/LAB DATA:  Revealed WBC of 3.6, RBC of 4.01, hemoglobin 14.1
, hematocrit of 42, platelet count of 125.  Sodium 139, potassium 4.0, chloride 
105, bicarb 30, BUN 13, creatinine 0.70, glucose 101, lactic 0.8, calcium 9.4, 
mag 1.9. Total bili 0.5, AST 12, ALT 14, alk phos 50.  CRP was 2.6.  Albumin 
was 4.2. Amylase and lipase normal.  He did have abdominal and pelvis CT 
obtained today which impression read, mild thickening of the wall of the 
proximal sigmoid colon, differential would include muscular hypertrophy from 
severe sigmoid diverticulosis, mild diverticulitis or colitis, less likely a 
colon mass, subcutaneous and mesenteric edema unchanged, hepatosplenomegaly 
unchanged, status post gastric sleeve procedure, mild grade 1 anterior 
spondylolisthesis at L5-S1 and bilateral spondylolisthesis at L5 level 
unchanged.  Old medical records were reviewed.

 

ASSESSMENT AND PLAN:  Mr. Royal is a 32-year-old male patient coming into the 
ER today with complaints of abdominal pain, also complaints of nausea, fever, 
and chills last night despite being on appropriate p.o. antibiotics.  The 
patient was evaluated in the ER today.  There is concern for recurrence of 
diverticulitis, we are asked to evaluate for admission.  He will be admitted 
under observation status for:

 

1.  Diverticulitis.  At this point, the patient has had several bouts of 
diverticulitis.  He has a followup with Dr. Good for sigmoid partial colectomy 
with Dr. Good next month.  The plan at this point, we will go ahead and give 
him IV fluids, p.r.n. Zofran, p.r.n. pain medications, put him on Zosyn and 
continue to follow closely.

2.  Hypertension.  We will monitor his blood pressure.  It has been resolved 
since he has lost 200 pounds.

3.  Obesity.  He is to continue with his current medical regimen.

4.  Obstructive sleep apnea.  Not an active issue anymore because of the weight 
loss.

5.  Narcolepsy.  Continue his Adderall.

6.  DVT prophylaxis.  He will be placed on heparin subcu.

7.  Code status.  He is a full code.

8.  Fluids, electrolytes and nutrition:  He can have a clear diet.

 

TIME SPENT:  Time spent on the admission was 60 minutes, greater than half the 
time was spent face-to-face with the patient obtaining my history and physical, 
the other half of the time was spent going over the plan of care with the 
patient and implementing the plan of care.

 

I did discuss the plan of care with my attending Dr. Alvarez, she is in 
agreement.

 

 ____________________________________ TYRESECHANEL JACOB NP

 

[id number]/[facility] #: [id number]

ASTRID

## 2017-10-04 LAB
ANION GAP SERPL CALC-SCNC: 2 MMOL/L (ref 2–11)
BUN SERPL-MCNC: 9 MG/DL (ref 6–24)
BUN/CREAT SERPL: 11.5 (ref 8–20)
CALCIUM SERPL-MCNC: 8.7 MG/DL (ref 8.6–10.3)
CHLORIDE SERPL-SCNC: 107 MMOL/L (ref 101–111)
GLUCOSE SERPL-MCNC: 89 MG/DL (ref 70–100)
HCO3 SERPL-SCNC: 28 MMOL/L (ref 22–32)
HCT VFR BLD AUTO: 36 % (ref 42–52)
HGB BLD-MCNC: 12 G/DL (ref 14–18)
MCH RBC QN AUTO: 34 PG (ref 27–31)
MCHC RBC AUTO-ENTMCNC: 34 G/DL (ref 31–36)
MCV RBC AUTO: 101 FL (ref 80–94)
POTASSIUM SERPL-SCNC: 4.2 MMOL/L (ref 3.5–5)
RBC # BLD AUTO: 3.52 10^6/UL (ref 4–5.4)
SODIUM SERPL-SCNC: 137 MMOL/L (ref 133–145)
WBC # BLD AUTO: 4.3 10^3/UL (ref 3.5–10.8)

## 2017-10-04 RX ADMIN — HYDROMORPHONE HYDROCHLORIDE PRN MG: 1 INJECTION, SOLUTION INTRAMUSCULAR; INTRAVENOUS; SUBCUTANEOUS at 02:08

## 2017-10-04 RX ADMIN — OXYCODONE HYDROCHLORIDE AND ACETAMINOPHEN PRN TAB: 5; 325 TABLET ORAL at 16:44

## 2017-10-04 RX ADMIN — ONDANSETRON PRN MG: 2 INJECTION INTRAMUSCULAR; INTRAVENOUS at 22:00

## 2017-10-04 RX ADMIN — HEPARIN SODIUM SCH UNITS: 5000 INJECTION INTRAVENOUS; SUBCUTANEOUS at 14:58

## 2017-10-04 RX ADMIN — DEXTROAMPHETAMINE SACCHARATE, AMPHETAMINE ASPARTATE, DEXTROAMPHETAMINE SULFATE AND AMPHETAMINE SULFATE SCH MG: 2.5; 2.5; 2.5; 2.5 TABLET ORAL at 08:27

## 2017-10-04 RX ADMIN — DEXTROAMPHETAMINE SACCHARATE, AMPHETAMINE ASPARTATE, DEXTROAMPHETAMINE SULFATE AND AMPHETAMINE SULFATE SCH MG: 2.5; 2.5; 2.5; 2.5 TABLET ORAL at 16:46

## 2017-10-04 RX ADMIN — HEPARIN SODIUM SCH UNITS: 5000 INJECTION INTRAVENOUS; SUBCUTANEOUS at 05:10

## 2017-10-04 RX ADMIN — DEXTROAMPHETAMINE SACCHARATE, AMPHETAMINE ASPARTATE, DEXTROAMPHETAMINE SULFATE AND AMPHETAMINE SULFATE SCH MG: 2.5; 2.5; 2.5; 2.5 TABLET ORAL at 12:19

## 2017-10-04 RX ADMIN — OXYCODONE HYDROCHLORIDE AND ACETAMINOPHEN PRN TAB: 5; 325 TABLET ORAL at 00:19

## 2017-10-04 RX ADMIN — ONDANSETRON PRN MG: 2 INJECTION INTRAMUSCULAR; INTRAVENOUS at 07:15

## 2017-10-04 RX ADMIN — OXYCODONE HYDROCHLORIDE AND ACETAMINOPHEN PRN TAB: 5; 325 TABLET ORAL at 21:59

## 2017-10-04 RX ADMIN — PIPERACILLIN AND TAZOBACTAM SCH MLS/HR: 3; .375 INJECTION, POWDER, LYOPHILIZED, FOR SOLUTION INTRAVENOUS; PARENTERAL at 00:19

## 2017-10-04 RX ADMIN — SODIUM CHLORIDE SCH MLS/HR: 900 IRRIGANT IRRIGATION at 01:12

## 2017-10-04 RX ADMIN — PIPERACILLIN AND TAZOBACTAM SCH MLS/HR: 3; .375 INJECTION, POWDER, LYOPHILIZED, FOR SOLUTION INTRAVENOUS; PARENTERAL at 16:37

## 2017-10-04 RX ADMIN — OXYCODONE HYDROCHLORIDE AND ACETAMINOPHEN PRN TAB: 5; 325 TABLET ORAL at 12:19

## 2017-10-04 RX ADMIN — PIPERACILLIN AND TAZOBACTAM SCH MLS/HR: 3; .375 INJECTION, POWDER, LYOPHILIZED, FOR SOLUTION INTRAVENOUS; PARENTERAL at 08:27

## 2017-10-04 NOTE — PN
Subjective


Date of Service: 10/04/17


Interval History: 





Patient seen this morning. Complaining of persistent pain. Says he usually gets 

bouts of diverticulitis and they are managed as an outpatient with oral ABx and 

pain medications but it did not help this time when discharged from ED with 

these meds a few days ago. Patient was discharged recently from Dr. Garcia's 

office which he states was for too many no-shows. On iStop review patient had 

been prescribed percocet by Dr. Garcia which the patient states was for 

diverticulitis although he seemed to be on percocet continuously for months, 

possible for back pain. Last rx from Dr. Garcia was a 10 day supply filled on 9/13

, he has also a few shorter courses prescribed recently by our ED docs and a 

dentist in [place]. 


Family History: Unchanged from Admission


Social History: Unchanged from Admission


Past Medical History: Unchanged from Admission





Objective


Active Medications: 








Acetaminophen (Tylenol Tab*)  650 mg PO Q4H PRN


Amphetamine/Dextroamphetamine (Adderall Tab*)  20 mg PO 0800,1200,1600 NILESH


Heparin Sodium (Porcine) (Heparin Vial(*))  5,000 units SUBCUT Q8HR NILESH


Hydromorphone HCl (Dilaudid Inj*)  0.5 mg IV SLOW PU Q4H PRN


Sodium Chloride (Ns 0.9% 1000 Ml*)  1,000 mls @ 125 mls/hr IV PER RATE NILESH


Piperacillin Sod/Tazobactam (Sod 3.375 gm/ Sodium Chloride)  100 mls @ 25 mls/

hr IVPB Q8H NILESH


Ondansetron HCl (Zofran Inj*)  4 mg IV Q6H PRN


Oxycodone/Acetaminophen (Percocet 5/325 Tab*)  1 tab PO Q4H PRN


Pharmacy Consult (Zosyn Per Pharmacy*)  1 note FOLLOW UP .ZOSYN PER PHARMACY [facility]





 Vital Signs











  10/03/17 10/03/17 10/03/17





  12:14 13:08 13:10


 


Temperature   


 


Pulse Rate   48


 


Respiratory 16  





Rate   


 


Blood Pressure  113/65 





(mmHg)   


 


O2 Sat by Pulse   99





Oximetry   














  10/04/17 10/04/17 10/04/17





  04:29 05:24 07:25


 


Temperature   97.3 F


 


Pulse Rate 44  48


 


Respiratory  14 18





Rate   


 


Blood Pressure   116/75





(mmHg)   


 


O2 Sat by Pulse   100





Oximetry   











Oxygen Devices in Use Now: None


Appearance: Young,  M, laying in bed in NAD


Eyes: No Scleral Icterus


Ears/Nose/Mouth/Throat: Mucous Membranes Moist


Neck: NL Appearance and Movements; NL JVP


Respiratory: Symmetrical Chest Expansion and Respiratory Effort, Clear to 

Auscultation


Cardiovascular: NL Sounds; No Murmurs; No JVD, - - bradycardia


Abdominal: - - Soft, non-distended, TTP in LLQ, BS+


Lymphatic: No Cervical Adenopathy


Extremities: No Edema


Skin: No Rash or Ulcers


Neurological: Alert and Oriented x 3


Result Diagrams: 


 10/04/17 05:33





 10/04/17 05:33





Assess/Plan/Problems-Billing


Assessment: 


Diverticulitis that has been unresponsive to outpatient PO ABx/narcotics in a 

31 yo M with hx of obesity s/p gastric sleeve 11/2016, diverticulosis and 

recurrent diverticulitis with planned sigmoid colectomy








- Patient Problems


(1) Diverticulitis


Current Visit: Yes   Comment: CT scan shows some evidence of mild 

diverticulitis. For now will keep on IV Zosyn. Concerned that there may be a 

component of drug-seeking. Will attempt to advance diet today. Has been 

bradycardic after IV dilaudid, will hold while HRs low. EKG shows sinus mitra. 

Is planned to have sigmoid colectomy by Dr. Good next month. Will attempt to 

get in touch with Dr. Garcia and Dr. Good   





(2) Narcolepsy


Current Visit: Yes   Comment: Continue adderall   





(3) DVT prophylaxis


Current Visit: Yes   Comment: HSQ

## 2017-10-05 VITALS — DIASTOLIC BLOOD PRESSURE: 71 MMHG | SYSTOLIC BLOOD PRESSURE: 117 MMHG

## 2017-10-05 RX ADMIN — DEXTROAMPHETAMINE SACCHARATE, AMPHETAMINE ASPARTATE, DEXTROAMPHETAMINE SULFATE AND AMPHETAMINE SULFATE SCH MG: 2.5; 2.5; 2.5; 2.5 TABLET ORAL at 08:09

## 2017-10-05 RX ADMIN — OXYCODONE HYDROCHLORIDE AND ACETAMINOPHEN PRN TAB: 5; 325 TABLET ORAL at 12:10

## 2017-10-05 RX ADMIN — HEPARIN SODIUM SCH UNITS: 5000 INJECTION INTRAVENOUS; SUBCUTANEOUS at 06:43

## 2017-10-05 RX ADMIN — DEXTROAMPHETAMINE SACCHARATE, AMPHETAMINE ASPARTATE, DEXTROAMPHETAMINE SULFATE AND AMPHETAMINE SULFATE SCH MG: 2.5; 2.5; 2.5; 2.5 TABLET ORAL at 12:10

## 2017-10-05 RX ADMIN — PIPERACILLIN AND TAZOBACTAM SCH MLS/HR: 3; .375 INJECTION, POWDER, LYOPHILIZED, FOR SOLUTION INTRAVENOUS; PARENTERAL at 01:00

## 2017-10-05 RX ADMIN — PIPERACILLIN AND TAZOBACTAM SCH MLS/HR: 3; .375 INJECTION, POWDER, LYOPHILIZED, FOR SOLUTION INTRAVENOUS; PARENTERAL at 08:00

## 2017-10-05 RX ADMIN — HEPARIN SODIUM SCH UNITS: 5000 INJECTION INTRAVENOUS; SUBCUTANEOUS at 01:00

## 2017-10-05 RX ADMIN — OXYCODONE HYDROCHLORIDE AND ACETAMINOPHEN PRN TAB: 5; 325 TABLET ORAL at 07:59

## 2017-10-05 RX ADMIN — OXYCODONE HYDROCHLORIDE AND ACETAMINOPHEN PRN TAB: 5; 325 TABLET ORAL at 02:51

## 2017-10-05 NOTE — DCNOTE
Patient seen this morning, reports continued pain. Tolerating full liquids, had 

normal BM. Explained that when I spoke to Dr. Garcia yesterday he stated he was 

not kicked out of the practice and patient states he received a call from their 

office yesterday confirming this, he misunderstood that if he did not go in for 

the urine screen he would not be allowed back and thought he had to do so 

within 48 hours. I explained my concern that the patient has been on narcotics 

for some long for this supposed recurrent diverticulitis. I explained his labs 

and imaging here were fairly unremarkable. I encouraged him to follow-up with 

Dr. Garcia and with Dr. Good. He can complete the ABx he was prescribed prior to 

this admission. 


On exam, RRR, s1 and s2 present, no m/g/r, abd with hyperactive BS, TTP in LLQ, 

no edema. 


D/C with PCP and Surgery f/u

## 2017-10-05 NOTE — DS
CC:  Brian Garcia MD; Michoacano Good MD *

 

DISCHARGE SUMMARY:

 

DATE OF ADMISSION:  10/03/17

 

DATE OF DISCHARGE:  10/05/17

 

PRIMARY CARE PHYSICIAN:  Brian Garcia MD

 

PRINCIPAL DISCHARGE DIAGNOSES:

1.  Possible mild diverticulitis.

2.  Abdominal pain.

 

SECONDARY DIAGNOSES:

1.  Obesity, status post gastric sleeve.

2.  Hypertension, which seems to have resolved.

3.  Narcolepsy.

4.  Recurrent diverticulitis.

 

DISCHARGE MEDICATION REGIMEN:

1.  Oxycodone 1 tablet by mouth every 6 hours as needed for pain.

2.  Flagyl 500 mg by mouth 3 times daily.

3.  Ciprofloxacin 500 mg by mouth 2 times daily.

4.  Adderall 20 mg by mouth 3 times daily.

5.  Zofran 4 mg by mouth every 6 hours as needed for nausea.

 

STUDIES DONE DURING HOSPITALIZATION:  CT of abdomen and pelvis with contrast, 
impression:  Mild thickening of the wall of the proximal sigmoid colon. 
Differential diagnosis would include muscular hypertrophy from severe sigmoid 
diverticulosis, mild diverticulitis or colitis less likely a colon mass, 
subcutaneous and mesenteric edema unchanged, hepatosplenomegaly unchanged, 
status post gastric sleeve procedure, mild grade 1 anterior spondylolisthesis 
at the L5-S1 level and bilateral spondylolysis at the L5 level unchanged.

 

HISTORY OF PRESENT ILLNESS AND HOSPITAL SUMMARY:  Please see the full history 
and physical by Tyrese Jacob NP, for full details.  Briefly, Mr. Royal is a 
32-year- old male with a past medical history as above, including recurrent 
diverticulitis, who presented to the hospital with persistent abdominal pain 
despite oral antibiotics and pain medications.  The patient reported a fever at 
home; however, he did not have any during this hospitalization.  His blood work 
was unremarkable with a normal white blood cell count and a CRP of 2.  Imaging 
showed findings that could be interpreted as mild diverticulitis.  As he seemed 
to have been failing outpatient therapy, the patient was admitted to the 
hospital for IV antibiotics.

 

The following day the patient was frequently requesting IV Dilaudid and it 
seemed to be causing bradycardia.  This was held and he was kept on oral pain 
medications alone while maintained on IV Zosyn.

 

On review of I-STOP, the patient had been prescribed Percocet in the past by 
Dr. Garcia; however, recently it seems that the patient did not come in for a 
urine screen and Dr. Garcia stopped prescribing Percocet for him.  It seems like 
since then, the patient has been to the emergency room a number of times, was 
also prescribed Percocet by a dentist in [place].  I spoke with Dr. Garcia, 
who stated he would be willing to take the patient back to his practice and 
that he was in fact never kicked out as the patient had thought.  I explained 
my concerns to the patient about his long-term narcotic use and encouraged him 
to follow up with Dr. Garcia and Dr. Good to determine about going forward with 
his planned sigmoid colectomy, which is scheduled for November.

 

TIME SPENT:  Total time spent on this discharge, 45 minutes.

 

This is a summary of the hospitalization.  Please see the full medical record 
for further details.

 

 [id number]/[facility] #: [id number]

ASTRID

## 2017-10-06 NOTE — ED
Isael SANDOVAL Nilda, scribed for Edu Griffin MD on 09/30/17 at 1243 .





Abdominal Pain/Male





- HPI Summary


HPI Summary: 


Patient is a 32 y.o. M presenting to [facility] with a chief complaint of sharp LLQ 

pain that radiates to the lower back that began yesterday 1400. He rates pain 7/

10 in severity. Symptoms aggravated and alleviated by nothing. Patient reports 

left flank pain, nausea, fever, diarrhea, and loss of appetite. Patient denies 

urination problems, vomiting, chills, diaphoresis, and melena. PMHx Crohn's 

disease, diverticulitis. No PMHx ulcers, C. difficile infection, renal calculi, 

and kidney infection. PSHx gastric bypass surgery one year ago. FHx UC. Patient 

notes pain is similar to when he had diverticulitis in the past. 





- History of Current Complaint


Chief Complaint: EDAbdPain


Stated Complaint: LOWER ABD/BACK PAIN


Hx Obtained From: Patient


Onset/Duration: Sudden Onset, Still Present


Timing: Constant


Severity Currently: Severe


Pain Intensity: 7


Pain Scale Used: 0-10 Numeric


Location: Discrete At: LLQ, Flank


Radiates: Yes


Radiates to: Back, Flank


Character: Sharp


Aggravating Factor(s): Nothing


Alleviating Factor(s): Nothing


Associated Signs And Symptoms: Positive: Other - left flank pain, nausea, fever

, diarrhea, and loss of appetite. Patient denies urination problems, vomiting, 

chills, diaphoresis, and melena.





- Allergies/Home Medications


Allergies/Adverse Reactions: 


 Allergies











Allergy/AdvReac Type Severity Reaction Status Date / Time


 


Codeine Allergy Severe Difficulty Verified 09/30/17 12:16





   Breathing  


 


NSAIDs Allergy Severe GI Upset Verified 09/30/17 12:16














PMH/Surg Hx/FS Hx/Imm Hx


Endocrine/Hematology History: 


   Denies: Hx Anticoagulant Therapy, Hx Diabetes, Hx Thyroid Disease


Cardiovascular History: 


   Denies: Hx Congestive Heart Failure, Hx Hypertension, Hx Pacemaker/ICD


Respiratory History: Reports: Hx Asthma - SEASONAL, Hx Sleep Apnea


   Denies: Hx Chronic Obstructive Pulmonary Disease (COPD)


GI History: Reports: Hx Crohn's Disease, Hx Diverticulosis, Hx Gastroesophageal 

Reflux Disease - ACID REFLUX, Other GI Disorders - HX OF DIVERTICULITIS-PRN 

PERCOCET FOR ABDOMINAL PAIN


   Denies: Hx Ulcer


 History: 


   Denies: Hx Dialysis, Hx Renal Disease


Musculoskeletal History: 


   Denies: Hx Arthritis


Sensory History: Reports: Hx Contacts or Glasses - GLASSES


   Denies: Hx Hearing Aid


Opthamlomology History: Reports: Hx Contacts or Glasses - GLASSES


Neurological History: Reports: Other Neuro Impairments/Disorders - NARCOLEPSY 

CONTROL WITH ADDERALL


   Denies: Hx Dementia, Hx Seizures


Psychiatric History: Reports: Hx Depression, Other Psychiatric Issues/Disorders 

- NARCOLEPSY


   Denies: Hx Panic Disorder, Hx Substance Abuse





- Surgical History


Surgery Procedure, Year, and Place: BILATERAL MYRINGOTOMY WITH TUBES X 7,.  

CIRCUMCISION AT AGE 9,.  TONSILLECTOMY AND ADENOIDECTOMY AS A CHILD.  

COLONOSCOPY.  LEFT WRIST surgery 9/17/13 - hardware placed.  GASTRIC SLEEVE 

SURGERY NOV 2016


Hx Anesthesia Reactions: No





- Immunization History


Date of Tetanus Vaccine: PT STATES UNSURE


Date of Influenza Vaccine: NONE


Infectious Disease History: No


Infectious Disease History: Reports: Hx Shingles - AS A CHILD


   Denies: Hx Clostridium Difficile, Hx Hepatitis, Hx Human Immunodeficiency 

Virus (HIV), Hx of Known/Suspected MRSA, Hx Tuberculosis, Hx Known/Suspected VRE

, Hx Known/Suspected VRSA, History Other Infectious Disease, Traveled Outside 

the [place] in Last 30 Days





- Family History


Known Family History: Positive: Other - UC


   Negative: Renal Disease, Seizure Disorder





- Social History


Alcohol Use: Rare


Substance Use Type: Reports: None


Smoking Status (MU): Former Smoker


Type: Cigarettes


Amount Used/How Often: 1 PPD X 10+ YEARS


Have You Smoked in the Last Year: Yes





Review of Systems


Positive: Fever.  Negative: Chills, Skin Diaphoresis


Negative: Erythema


Negative: Sore Throat


Negative: Chest Pain


Negative: Shortness Of Breath, Cough


Positive: Abdominal Pain, Diarrhea, Nausea, Other - loss of appetite; negative 

melena.  Negative: Vomiting


Positive: flank pain - left.  Negative: dysuria, hematuria


Positive: Other - back pain.  Negative: Myalgia, Edema


Negative: Rash


Neurological: Other - Negative dizziness


All Other Systems Reviewed And Are Negative: Yes





Physical Exam





- Summary


Physical Exam Summary: 


Constitutional: Well-developed, Well-nourished, Alert. (-) Distressed


Skin: Warm, Dry


HENT: Normocephalic; Atraumatic Eyes: Conjunctiva normal


Neck: Musculoskeletal ROM normal neck. (-) JVD, (-) Stridor, (-) Tracheal 

deviation


Cardio: Rhythm regular, rate normal, Heart sounds normal; Intact distal pulses; 

The pedal pulses are 2+ and symmetric. Radial pulses are 2+ and symmetric. (-) 

Murmur


Pulmonary/Chest wall: Effort normal. (-) Respiratory distress, (-) Wheezes, (-) 

Rales


Abd: Soft, (-) Distension, (-) Guarding, (-) Rebound, LLQ tenderness and mild 

CVA tenderness. 


Musculoskeletal: (-) Edema


Lymph: (-) Cervical adenopathy


Neuro: Alert, Oriented x3


Psych: Mood and affect Normal


Triage Information Reviewed: Yes


Vital Signs On Initial Exam: 


 Initial Vitals











Temp Pulse Resp BP Pulse Ox


 


 97.0 F   58   14   125/62   99 


 


 09/30/17 12:12  09/30/17 12:12  09/30/17 12:12  09/30/17 12:12  09/30/17 12:12











Vital Signs Reviewed: Yes





Diagnostics





- Vital Signs


 Vital Signs











  Temp Pulse Resp BP Pulse Ox


 


 09/30/17 12:12  97.0 F  58  14  125/62  99














- Laboratory


Result Diagrams: 


 09/30/17 13:55





 09/30/17 13:55


Lab Statement: Any lab studies that have been ordered have been reviewed, and 

results considered in the medical decision making process.





- CT


  ** Abd/Pelvis


CT Interpretation Completed By: Radiologist - 1.  Hepatomegaly with hepatic 

steatosis. Mild splenomegaly. 2.  Presumed bariatric surgery. 3.  Scattered 

diverticula of the sigmoid colon but no CT evidence of acute diverticulitis. ED 

Physician reviewed report and agrees.





Re-Evaluation





- Re-Evaluation


  ** First Eval


Re-Evaluation Time: 15:52


Comment: Patient still has LLQ tenderness, no peritoneal signs, bp 90 systolic. 

Patient reports that symptoms similar to past diverticulitis.





  ** Second Eval


Re-Evaluation Time: 18:07


Comment: Patient still rates pain 7/10. Mild skin discoloration that is band 

like and heads posterior noted by ED Physician.





  ** Third Eval


Re-Evaluation Time: 20:13


Comment: Patient has improved. Tolerating PO and nontender upon re-exam.





Abdominal Pain Fem Course/Dx





- Course


Assessment/Plan: Patient is a 32 y.o. M presenting to [facility] with a chief 

complaint of sharp LLQ pain that radiates to the lower back that began 

yesterday 1400. He rates pain 7/10 in severity. Symptoms aggravated and 

alleviated by nothing. Patient reports left flank pain, nausea, fever, diarrhea

, and loss of appetite. Patient denies urination problems, vomiting, chills, 

diaphoresis, and melena. PMHx Crohn's disease, diverticulitis. No PMHx ulcers, 

C. difficile infection, renal calculi, and kidney infection. PSHx gastric 

bypass surgery one year ago. FHx UC. Patient notes pain is similar to when had 

diverticulitis in the past.  CT Abdominal/Pelvis reveals:  1.  Hepatomegaly 

with hepatic steatosis. Mild splenomegaly.  2.  Presumed bariatric surgery.  3.

  Scattered diverticula of the sigmoid colon but no CT evidence of acute 

diverticulitis.  ED Physician reviewed report and agrees.  [18:12] consult with 

Dr. Good regarding patient. Skin changes seen on CT could rep redundant skin 

and weight loss but will corroborate this with radiology.  [18:40] consult with 

Dr. Walters (radiologist) regarding patients CT.  That edema was diffuse 

likely reflecting systemic process such as hypoproteinemia. Dr. Good 

recommends patient takes cipro flagyl and agrees to follow up with patient this 

week.  Patient was discharged with a diagnosis of LLQ pain and abdominal edema. 

Patient must follow up with Dr. Good in 2-3 days. Patient understands and 

agrees to plan.





- Diagnoses


Provider Diagnoses: 


 LLQ pain, Abdominal edema on examination








- Provider Notifications


Discussed Care Of Patient With: Michoacano Good - Surgery


Time Discussed With Above Provider: 18:12


Instructed by Provider To: Other - Consult with Dr. Good regarding patient. 

Skin changes seen on CT could rep redundant skin and weight loss but will 

coaberate this with radiology.





Discharge





- Discharge Plan


Condition: Stable


Disposition: HOME


Prescriptions: 


Ciprofloxacin TAB* [Cipro 500 MG TAB*] 500 mg PO BID #14 tab


Metronidazole [Flagyl 500 MG TAB] 500 mg PO TID #21 tab


oxyCODONE/Acetamin 5/325 MG* [Percocet 5/325 TAB*] 1 tab PO Q4H PRN #18 tab MDD 

6


 PRN Reason: Pain Scale 6-10


Patient Education Materials:  Abdominal Pain (ED)


Referrals: 


Michoacano Good MD [Medical Doctor] - 2 Days


Additional Instructions: 


RETURN TO THE EMERGENCY DEPARTMENT FOR CHANGING OR WORSENING SYMPTOMS.





The documentation as recorded by the scribe, Kirkland,Kassie accurately 

reflects the service I personally performed and the decisions made by me, Edu Griffin MD.

## 2017-11-08 ENCOUNTER — HOSPITAL ENCOUNTER (INPATIENT)
Dept: HOSPITAL 25 - AA | Age: 32
LOS: 74 days | Discharge: HOME | DRG: 221 | End: 2018-01-21
Attending: SURGERY | Admitting: SURGERY
Payer: MEDICAID

## 2017-11-08 DIAGNOSIS — Z98.84: ICD-10-CM

## 2017-11-08 DIAGNOSIS — G47.419: ICD-10-CM

## 2017-11-08 DIAGNOSIS — F32.9: ICD-10-CM

## 2017-11-08 DIAGNOSIS — K57.32: Primary | ICD-10-CM

## 2017-11-08 DIAGNOSIS — G89.29: ICD-10-CM

## 2017-11-08 DIAGNOSIS — Z83.79: ICD-10-CM

## 2017-11-08 DIAGNOSIS — Z88.6: ICD-10-CM

## 2017-11-08 DIAGNOSIS — Z23: ICD-10-CM

## 2017-11-08 DIAGNOSIS — Z87.891: ICD-10-CM

## 2017-11-08 DIAGNOSIS — M54.5: ICD-10-CM

## 2017-11-08 DIAGNOSIS — Z88.5: ICD-10-CM

## 2017-11-08 DIAGNOSIS — G47.33: ICD-10-CM

## 2017-11-08 DIAGNOSIS — Z98.1: ICD-10-CM

## 2017-11-08 DIAGNOSIS — J45.909: ICD-10-CM

## 2017-11-08 PROCEDURE — 85025 COMPLETE CBC W/AUTO DIFF WBC: CPT

## 2017-11-08 PROCEDURE — 85730 THROMBOPLASTIN TIME PARTIAL: CPT

## 2017-11-08 PROCEDURE — 80048 BASIC METABOLIC PNL TOTAL CA: CPT

## 2017-11-08 PROCEDURE — 90686 IIV4 VACC NO PRSV 0.5 ML IM: CPT

## 2017-11-08 PROCEDURE — 36415 COLL VENOUS BLD VENIPUNCTURE: CPT

## 2017-11-08 PROCEDURE — C1776 JOINT DEVICE (IMPLANTABLE): HCPCS

## 2017-11-08 PROCEDURE — 88307 TISSUE EXAM BY PATHOLOGIST: CPT

## 2017-11-08 PROCEDURE — 83735 ASSAY OF MAGNESIUM: CPT

## 2017-11-08 PROCEDURE — 84100 ASSAY OF PHOSPHORUS: CPT

## 2018-01-04 NOTE — HP
CC:  Dr. Brian Garcia *

 

ADMISSION HISTORY AND PHYSICAL:

 

DATE OF ADMISSION:  01/17/18

 

ATTENDING SURGEON:  Michoacano Good MD * (DICTATED BY CHEMA IBSHOP)

 

CHIEF COMPLAINT:  Recurrent diverticulitis.

 

HISTORY OF PRESENT ILLNESS:  This is a 32-year-old male, who has had multiple 
bouts of diverticulitis over the past 

3 to 5 years.  These typically occur as left lower quadrant pain, sometimes 
radiating to the back associated with nausea, vomiting, and fever.  He has had 
at least 2 to 3 hospitalizations for this including most recently on 10/03/17.  
He has been treated with appropriate courses of antibiotics and has been symptom
-free in the last month or two.  He states that he underwent colonoscopy within 
the past few years with no problems reported.  He did undergo a laparoscopic 
sleeve gastrectomy in November 2016 with Dr. Good with resultant significant 
weight loss.  The patient was seen in the office on 09/14/17 and again on 10/02/
17 and today on 01/04/18 by Dr. Good.  The patient has no complaints at the 
present time.  Dr. Good has reviewed with him the indications, risks, benefits
, and alternatives of surgery as well as the expected perioperative course. The 
patient would like to proceed as scheduled with laparoscopic-assisted sigmoid 
colectomy.  He will complete standard mechanical bowel prep with oral 
antibiotics and received preoperative antibiotics per protocol.

 

PAST MEDICAL HISTORY:  Recurrent diverticulitis as noted above, morbid obesity (
resolved) status post laparoscopic sleeve gastrectomy in 2016, obstructive 
sleep apnea (no longer uses CPAP), narcolepsy, chronic low back pain.

 

PREVIOUS SURGERIES:  Include left wrist fusion, multiple tympanostomy tubes, 
circumcision, tonsillectomy, and adenoidectomy.  No report of surgical or 
anesthesia complications.

 

CURRENT MEDICATIONS:

1.  Percocet 5/325 one tablet b.i.d. (the patient has been tapering gradually 
in preparation for surgery.)

2.  Tramadol 50 mg 4 times a day (the patient also encouraged to taper this in 
preparation for surgery if he is able.)

3.  Adderall 20 mg t.i.d. (for narcolepsy)

4.  Omeprazole 20 mg daily p.r.n.

5.  Zofran 4 mg ODT q.6 hours p.r.n.

 

DRUG ALLERGIES:  CODEINE (difficulty breathing) (the patient tolerates oxycodone
). NSAIDS (general recommendation against using status post sleeve gastrectomy.)

 

FAMILY HISTORY:  Positive for diverticulitis in his father.  There is no known 
family history of anesthesia problems, bleeding, or clotting disorders.

 

SOCIAL HISTORY:  The patient lives alone at home with his son.  He is employed 
as a baker.  He quit smoking in 2016 after 1 pack per day for 15 years.  He 
drinks alcohol rarely and denies other recreational drug use.

 

REVIEW OF SYSTEMS:  General:  No recent constitutional symptoms or acute 
illnesses. Intentional weight loss over the past year.  Cardiovascular:  No 
chest pain, palpitations, history of hypertension.  Respiratory:  No history of 
asthma, chronic cough, or shortness of breath.  He was previously treated for 
sleep apnea, but no longer uses CPAP.  GI:  Minimal if any GERD symptoms.  No 
current lower GI symptoms.  No blood per rectum.  See also above per HPI.  :  
No problems reported.  Endocrine:  No diabetes or thyroid dysfunction.

 

                               PHYSICAL EXAMINATION

 

GENERAL:  Well nourished, well developed male, in no acute distress.

 

VITAL SIGNS:  Height 64 inches, weight 171 pounds, BMI 29.  Temperature 96.7, 
blood pressure 117/66, pulse 68, respirations 16.

 

SKIN:  Warm and dry.  No suspicious rashes or lesions.

 

HEENT:  Pupils are equal and round, reactive.  EOMs intact.  No conjunctival 
pallor.  Oropharynx:  Most teeth are missing, remaining teeth are in fair-to-
poor repair.  No intraoral lesions.

 

NECK:  No lymphadenopathy, thyromegaly, or masses.

 

LUNGS:  Clear to auscultation.  No rales or wheezes.

 

HEART:  Regular rate and rhythm.  No murmur noted.

 

ABDOMEN:  Well-healed laparoscopic incisions from prior surgery.  Soft, 
nontender to palpation.  No palpable masses or organomegaly.

 

GENITALIA:  Not done.  No problems reported.

 

RECTAL:  Not done.  No problems reported.

 

BACK:  No spinous process or CVA tenderness.

 

EXTREMITIES:  No edema.

 

NEUROLOGICAL:  Grossly intact.

 

 IMPRESSION:  Recurrent diverticulitis.

 

PLAN:  Laparoscopic-assisted sigmoid colectomy.

 

 ____________________________________ CHEMA BISHOP

 

[id number]/CPS #: [id number]

[facility] HospitalEDU

## 2018-01-17 PROCEDURE — 0DBN4ZZ EXCISION OF SIGMOID COLON, PERCUTANEOUS ENDOSCOPIC APPROACH: ICD-10-PCS | Performed by: SURGERY

## 2018-01-17 RX ADMIN — ACETAMINOPHEN SCH: 325 TABLET ORAL at 21:00

## 2018-01-17 RX ADMIN — FENTANYL CITRATE PRN MCG: 0.05 INJECTION, SOLUTION INTRAMUSCULAR; INTRAVENOUS at 22:11

## 2018-01-17 RX ADMIN — DEXTROAMPHETAMINE SACCHARATE, AMPHETAMINE ASPARTATE, DEXTROAMPHETAMINE SULFATE AND AMPHETAMINE SULFATE SCH: 2.5; 2.5; 2.5; 2.5 TABLET ORAL at 21:00

## 2018-01-17 RX ADMIN — FENTANYL CITRATE PRN MCG: 0.05 INJECTION, SOLUTION INTRAMUSCULAR; INTRAVENOUS at 22:02

## 2018-01-17 RX ADMIN — MORPHINE SULFATE SCH MLS/HR: 5 INJECTION, SOLUTION INTRAVENOUS at 21:10

## 2018-01-17 NOTE — OP
Operative Report - Blank





- Operative Report


Date of Operation: 01/17/18


Note: 





Preop and Postop Dx: recurrent diverticulitis


Procedure: Laparoscopic sigmoid colectomy


Anesthesia: GET; epidural


Surgeon: Florentino


Asst: CHEMA Soto; CHANELL Pickard


Fluids: 3100 ml RL


EBL: 100 ml


Specimen: sigmoid colon


Drains: none


Findings: dictated

## 2018-01-18 LAB
BASOPHILS # BLD AUTO: 0 10^3/UL (ref 0–0.2)
EOSINOPHIL # BLD AUTO: 0 10^3/UL (ref 0–0.6)
HCT VFR BLD AUTO: 37 % (ref 42–52)
HGB BLD-MCNC: 12.7 G/DL (ref 14–18)
LYMPHOCYTES # BLD AUTO: 0.5 10^3/UL (ref 1–4.8)
MCH RBC QN AUTO: 34 PG (ref 27–31)
MCHC RBC AUTO-ENTMCNC: 34 G/DL (ref 31–36)
MCV RBC AUTO: 100 FL (ref 80–94)
MONOCYTES # BLD AUTO: 0.4 10^3/UL (ref 0–0.8)
NEUTROPHILS # BLD AUTO: 8.3 10^3/UL (ref 1.5–7.7)
NRBC # BLD AUTO: 0 10^3/UL
NRBC BLD QL AUTO: 0
PLATELET # BLD AUTO: 133 10^3/UL (ref 150–450)
RBC # BLD AUTO: 3.71 10^6/UL (ref 4–5.4)
WBC # BLD AUTO: 9.2 10^3/UL (ref 3.5–10.8)

## 2018-01-18 RX ADMIN — ROPIVACAINE HYDROCHLORIDE SCH MLS/HR: 2 INJECTION, SOLUTION EPIDURAL; INFILTRATION at 12:30

## 2018-01-18 RX ADMIN — OXYCODONE HYDROCHLORIDE PRN MG: 5 CAPSULE ORAL at 16:34

## 2018-01-18 RX ADMIN — OXYCODONE HYDROCHLORIDE PRN MG: 5 CAPSULE ORAL at 12:18

## 2018-01-18 RX ADMIN — ACETAMINOPHEN SCH MG: 325 TABLET ORAL at 20:22

## 2018-01-18 RX ADMIN — DEXTROAMPHETAMINE SACCHARATE, AMPHETAMINE ASPARTATE, DEXTROAMPHETAMINE SULFATE AND AMPHETAMINE SULFATE SCH MG: 2.5; 2.5; 2.5; 2.5 TABLET ORAL at 14:07

## 2018-01-18 RX ADMIN — FAMOTIDINE SCH MG: 10 INJECTION, SOLUTION INTRAVENOUS at 20:26

## 2018-01-18 RX ADMIN — DEXTROAMPHETAMINE SACCHARATE, AMPHETAMINE ASPARTATE, DEXTROAMPHETAMINE SULFATE AND AMPHETAMINE SULFATE SCH MG: 2.5; 2.5; 2.5; 2.5 TABLET ORAL at 09:25

## 2018-01-18 RX ADMIN — ROPIVACAINE HYDROCHLORIDE SCH MLS/HR: 2 INJECTION, SOLUTION EPIDURAL; INFILTRATION at 22:04

## 2018-01-18 RX ADMIN — HEPARIN SODIUM SCH UNITS: 5000 INJECTION INTRAVENOUS; SUBCUTANEOUS at 16:34

## 2018-01-18 RX ADMIN — DEXTROAMPHETAMINE SACCHARATE, AMPHETAMINE ASPARTATE, DEXTROAMPHETAMINE SULFATE AND AMPHETAMINE SULFATE SCH MG: 2.5; 2.5; 2.5; 2.5 TABLET ORAL at 20:22

## 2018-01-18 RX ADMIN — FAMOTIDINE SCH MG: 10 INJECTION, SOLUTION INTRAVENOUS at 09:25

## 2018-01-18 RX ADMIN — HEPARIN SODIUM SCH UNITS: 5000 INJECTION INTRAVENOUS; SUBCUTANEOUS at 09:24

## 2018-01-18 RX ADMIN — ACETAMINOPHEN SCH: 325 TABLET ORAL at 05:02

## 2018-01-18 RX ADMIN — ACETAMINOPHEN SCH MG: 325 TABLET ORAL at 12:17

## 2018-01-18 RX ADMIN — OXYCODONE HYDROCHLORIDE PRN MG: 5 CAPSULE ORAL at 20:22

## 2018-01-18 NOTE — PN
Progress Note





- Progress Note


Date of Service: 01/18/18


SOAP: 


Subjective:


[Pt seen and examined.  RLQ pain/burning.  thirsty, but no appetite.


No flatus








Objective:


af vss


lungs clear


abdo: soft/ ND/ incisiona tenderness


   dressing cdi


no calf tenderness








Assessment:


POD 1 sigmoid colectomy








Plan:


d/c conley


pain control


OOB


DVT prophylaxis

## 2018-01-18 NOTE — PN
Progress Note





- Progress Note


Date of Service: 01/18/18


SOAP: 


Subjective:


[The patient reports he is currently in 8/10 pain and states he is unable to 

ambulate or cough due to the pain. The patient states he is tolerating water 

and denies N/V. ]








Objective:


[


 Vital Signs











Temp  98.0 F   01/18/18 04:38


 


Pulse  67   01/18/18 04:20


 


Resp  12   01/18/18 06:30


 


BP  112/48   01/18/18 04:38


 


Pulse Ox  98   01/18/18 06:30








 Intake & Output











 01/17/18 01/18/18 01/18/18





 18:59 06:59 18:59


 


Intake Total  4057 


 


Output Total  1600 


 


Balance  2457 


 


Weight 171 lb  


 


Intake:   


 


  IV Fluids  4057 


 


    LR  957 


 


    lr  3100 


 


  Oral  0 


 


Output:   


 


  Conley  1500 


 


  Estimated Blood Loss  100 








Vitals Reviewed: Stable


General: The patient does not appear in acute distress


Neurologic: Al&O x3


Heart: RRR, no murmur


Lungs: Clear to auscultation


Abdomen: Hypoactive bowel sounds, Non-distended, tender in the RLQ & LLQ, 

incisions stapled, clean dry and intact.


Extremities: No evidence of edema]








Assessment:


[This is a 32 y.o male POD #1 s/p laparascopic sigmoid colectomy who is 

currently stable. ]








Plan:


[- Continue clear liquids, may advance later


- Discontinue conley, pending ambulation


- Pain is being controlled as tolerated]

## 2018-01-19 RX ADMIN — ACETAMINOPHEN SCH MG: 325 TABLET ORAL at 05:15

## 2018-01-19 RX ADMIN — ACETAMINOPHEN SCH MG: 325 TABLET ORAL at 13:15

## 2018-01-19 RX ADMIN — ACETAMINOPHEN SCH MG: 325 TABLET ORAL at 20:02

## 2018-01-19 RX ADMIN — ROPIVACAINE HYDROCHLORIDE SCH MLS/HR: 2 INJECTION, SOLUTION EPIDURAL; INFILTRATION at 18:25

## 2018-01-19 RX ADMIN — MORPHINE SULFATE SCH MLS/HR: 5 INJECTION, SOLUTION INTRAVENOUS at 21:48

## 2018-01-19 RX ADMIN — OXYCODONE HYDROCHLORIDE PRN MG: 5 CAPSULE ORAL at 05:16

## 2018-01-19 RX ADMIN — DEXTROAMPHETAMINE SACCHARATE, AMPHETAMINE ASPARTATE, DEXTROAMPHETAMINE SULFATE AND AMPHETAMINE SULFATE SCH MG: 2.5; 2.5; 2.5; 2.5 TABLET ORAL at 20:01

## 2018-01-19 RX ADMIN — ROPIVACAINE HYDROCHLORIDE SCH MLS/HR: 2 INJECTION, SOLUTION EPIDURAL; INFILTRATION at 07:51

## 2018-01-19 RX ADMIN — OXYCODONE HYDROCHLORIDE PRN MG: 5 CAPSULE ORAL at 09:34

## 2018-01-19 RX ADMIN — DEXTROAMPHETAMINE SACCHARATE, AMPHETAMINE ASPARTATE, DEXTROAMPHETAMINE SULFATE AND AMPHETAMINE SULFATE SCH MG: 2.5; 2.5; 2.5; 2.5 TABLET ORAL at 13:33

## 2018-01-19 RX ADMIN — DEXTROAMPHETAMINE SACCHARATE, AMPHETAMINE ASPARTATE, DEXTROAMPHETAMINE SULFATE AND AMPHETAMINE SULFATE SCH MG: 2.5; 2.5; 2.5; 2.5 TABLET ORAL at 09:34

## 2018-01-19 RX ADMIN — HEPARIN SODIUM SCH UNITS: 5000 INJECTION INTRAVENOUS; SUBCUTANEOUS at 07:59

## 2018-01-19 RX ADMIN — HEPARIN SODIUM SCH UNITS: 5000 INJECTION INTRAVENOUS; SUBCUTANEOUS at 16:52

## 2018-01-19 RX ADMIN — HEPARIN SODIUM SCH UNITS: 5000 INJECTION INTRAVENOUS; SUBCUTANEOUS at 00:25

## 2018-01-19 RX ADMIN — ROPIVACAINE HYDROCHLORIDE SCH: 2 INJECTION, SOLUTION EPIDURAL; INFILTRATION at 10:44

## 2018-01-19 RX ADMIN — OXYCODONE HYDROCHLORIDE PRN MG: 5 CAPSULE ORAL at 13:33

## 2018-01-19 RX ADMIN — FAMOTIDINE SCH MG: 10 INJECTION, SOLUTION INTRAVENOUS at 07:59

## 2018-01-19 RX ADMIN — OXYCODONE HYDROCHLORIDE PRN MG: 5 CAPSULE ORAL at 20:02

## 2018-01-19 RX ADMIN — DEXTROSE MONOHYDRATE AND SODIUM CHLORIDE SCH MLS/HR: 5; .45 INJECTION, SOLUTION INTRAVENOUS at 16:15

## 2018-01-19 RX ADMIN — FAMOTIDINE SCH MG: 10 INJECTION, SOLUTION INTRAVENOUS at 20:04

## 2018-01-19 NOTE — PN
Progress Note





- Progress Note


Date of Service: 01/19/18


Note: 


Patient seen and examined at bedside.


Agree with management and plans from prior note. 


Advance diet to full liquids


Ambulate as tolerated


D/C plans, likely sometime this weekend if improving.

## 2018-01-19 NOTE — PN
Progress Note





- Progress Note


Date of Service: 01/19/18


SOAP: 


Subjective:


[The patient reports the pain has improved and states he has been ambulating 3-

4x daily. The patient also reports bloody tinged flatus last night and states 

he has been urinating. The pt states he is hungry and wants his diet advanced. 

He also reports the anesthesiologist visited him yesterday. The patient denies N

/V, chest pain and SOB.  ]








Objective:


[


 Vital Signs











Temp  98.0 F   01/19/18 07:17


 


Pulse  66   01/19/18 07:17


 


Resp  20   01/19/18 09:57


 


BP  102/53   01/19/18 07:17


 


Pulse Ox  98   01/19/18 09:57








 Intake & Output











 01/18/18 01/19/18 01/19/18





 18:59 06:59 18:59


 


Intake Total 997 2247 


 


Output Total 750 900 0


 


Balance 247 1347 0


 


Intake:   


 


  IV Fluids 972 2187 


 


     2187 


 


  Oral 25 60 


 


Output:   


 


  Urine 500 900 0


 


  Albert 250  








Vitals Reviewed: Stable ]


General: The patient was lying comfortably in bed


Neurologic: A&O x3


Heart: RRR, no murmur


Lungs: Clear to auscultation bilaterally 


Abdomen: Active bowel sounds in all four quadrants, non-distended, tender in 

the LLQ and RLQ. Stapled incisions w/ dressings are clean dry and intact.


Extremities: No evidence of edema 








Assessment:


[This is a 32 y.o male POD #2 s/p laparascopic sigmoid colectomy who is 

currently stable. ]








Plan:


[- Albert was discontinued 


- Will advance diet to full liquids  ]








<Nicole Rajput - Last Filed: 01/19/18 10:54>





- Progress Note


SOAP: 


I saw and evaluated the patient. Agree with NPP's note.








<Michoacano Good - Last Filed: 01/20/18 08:20>

## 2018-01-20 LAB
BASOPHILS # BLD AUTO: 0 10^3/UL (ref 0–0.2)
EOSINOPHIL # BLD AUTO: 0.1 10^3/UL (ref 0–0.6)
HCT VFR BLD AUTO: 31 % (ref 42–52)
HGB BLD-MCNC: 11.3 G/DL (ref 14–18)
LYMPHOCYTES # BLD AUTO: 1.7 10^3/UL (ref 1–4.8)
MCH RBC QN AUTO: 36 PG (ref 27–31)
MCHC RBC AUTO-ENTMCNC: 36 G/DL (ref 31–36)
MCV RBC AUTO: 99 FL (ref 80–94)
MONOCYTES # BLD AUTO: 0.3 10^3/UL (ref 0–0.8)
NEUTROPHILS # BLD AUTO: 3.7 10^3/UL (ref 1.5–7.7)
NRBC # BLD AUTO: 0 10^3/UL
NRBC BLD QL AUTO: 0.1
PLATELET # BLD AUTO: 102 10^3/UL (ref 150–450)
RBC # BLD AUTO: 3.14 10^6/UL (ref 4–5.4)
WBC # BLD AUTO: 5.8 10^3/UL (ref 3.5–10.8)

## 2018-01-20 RX ADMIN — OXYCODONE HYDROCHLORIDE PRN MG: 5 CAPSULE ORAL at 00:15

## 2018-01-20 RX ADMIN — HEPARIN SODIUM SCH UNITS: 5000 INJECTION INTRAVENOUS; SUBCUTANEOUS at 00:10

## 2018-01-20 RX ADMIN — OXYCODONE HYDROCHLORIDE PRN MG: 5 CAPSULE ORAL at 13:53

## 2018-01-20 RX ADMIN — ACETAMINOPHEN SCH MG: 325 TABLET ORAL at 04:57

## 2018-01-20 RX ADMIN — OXYCODONE HYDROCHLORIDE PRN MG: 5 CAPSULE ORAL at 17:58

## 2018-01-20 RX ADMIN — DEXTROAMPHETAMINE SACCHARATE, AMPHETAMINE ASPARTATE, DEXTROAMPHETAMINE SULFATE AND AMPHETAMINE SULFATE SCH MG: 2.5; 2.5; 2.5; 2.5 TABLET ORAL at 13:54

## 2018-01-20 RX ADMIN — ROPIVACAINE HYDROCHLORIDE SCH: 2 INJECTION, SOLUTION EPIDURAL; INFILTRATION at 12:50

## 2018-01-20 RX ADMIN — ACETAMINOPHEN SCH MG: 325 TABLET ORAL at 12:42

## 2018-01-20 RX ADMIN — OXYCODONE HYDROCHLORIDE PRN MG: 5 CAPSULE ORAL at 08:00

## 2018-01-20 RX ADMIN — DEXTROSE MONOHYDRATE AND SODIUM CHLORIDE SCH MLS/HR: 5; .45 INJECTION, SOLUTION INTRAVENOUS at 16:43

## 2018-01-20 RX ADMIN — DEXTROSE MONOHYDRATE AND SODIUM CHLORIDE SCH MLS/HR: 5; .45 INJECTION, SOLUTION INTRAVENOUS at 03:54

## 2018-01-20 RX ADMIN — KETOROLAC TROMETHAMINE PRN MG: 30 INJECTION, SOLUTION INTRAMUSCULAR; INTRAVENOUS at 12:43

## 2018-01-20 RX ADMIN — HEPARIN SODIUM SCH UNITS: 5000 INJECTION INTRAVENOUS; SUBCUTANEOUS at 13:54

## 2018-01-20 RX ADMIN — OXYCODONE HYDROCHLORIDE PRN MG: 5 CAPSULE ORAL at 03:54

## 2018-01-20 RX ADMIN — HEPARIN SODIUM SCH UNITS: 5000 INJECTION INTRAVENOUS; SUBCUTANEOUS at 22:01

## 2018-01-20 RX ADMIN — OXYCODONE HYDROCHLORIDE PRN MG: 5 CAPSULE ORAL at 22:00

## 2018-01-20 RX ADMIN — FAMOTIDINE SCH MG: 10 INJECTION, SOLUTION INTRAVENOUS at 07:57

## 2018-01-20 RX ADMIN — DEXTROAMPHETAMINE SACCHARATE, AMPHETAMINE ASPARTATE, DEXTROAMPHETAMINE SULFATE AND AMPHETAMINE SULFATE SCH MG: 2.5; 2.5; 2.5; 2.5 TABLET ORAL at 22:00

## 2018-01-20 RX ADMIN — KETOROLAC TROMETHAMINE PRN MG: 30 INJECTION, SOLUTION INTRAMUSCULAR; INTRAVENOUS at 23:26

## 2018-01-20 RX ADMIN — ROPIVACAINE HYDROCHLORIDE SCH MLS/HR: 2 INJECTION, SOLUTION EPIDURAL; INFILTRATION at 01:14

## 2018-01-20 RX ADMIN — FAMOTIDINE SCH MG: 10 INJECTION, SOLUTION INTRAVENOUS at 22:01

## 2018-01-20 RX ADMIN — DEXTROAMPHETAMINE SACCHARATE, AMPHETAMINE ASPARTATE, DEXTROAMPHETAMINE SULFATE AND AMPHETAMINE SULFATE SCH MG: 2.5; 2.5; 2.5; 2.5 TABLET ORAL at 09:39

## 2018-01-20 RX ADMIN — ACETAMINOPHEN SCH MG: 325 TABLET ORAL at 22:01

## 2018-01-20 NOTE — PN
Progress Note





- Progress Note


Date of Service: 01/20/18


SOAP: 


Subjective:


He is having more pain since this morning.  His CEI was decreased.  He doesn't 

feel the PCA is adequate.  He has been walking and passing gas.








Objective:


 Vital Signs











Temp  97.6 F   01/20/18 08:03


 


Pulse  71   01/20/18 08:03


 


Resp  16   01/20/18 09:39


 


BP  105/57   01/20/18 08:03


 


Pulse Ox  100   01/20/18 08:03





Gen:. appears NAD, sitting in bed.


Lungs: CTA B


Abd: ND, incis c/d/i; no erythema; soft but tender throughout.


 Intake & Output











 01/19/18 01/20/18 01/20/18





 18:59 06:59 18:59


 


Intake Total 1700 2302 


 


Output Total 0 125 


 


Balance 1700 2177 


 


Intake:   


 


  IV Fluids  982 


 


    LR  982 


 


  IVPB 1240  


 


    LR 1240  


 


  Oral 460 1320 


 


Output:   


 


  Urine 0 125 


 


Other:   


 


  Estimated Void Medium Medium 


 


  # Bowel Movements  0 


 


  # Voids 4 5 








 Laboratory Results - last 24 hr











  01/20/18 01/20/18 01/20/18





  05:02 05:02 07:12


 


WBC  5.8  


 


RBC  3.14 L  


 


Hgb  11.3 L  


 


Hct  31 L  


 


MCV  99 H  


 


MCH  36 H  


 


MCHC  36  


 


RDW  13  


 


Plt Count  102 L  


 


MPV  9  


 


Neut % (Auto)  64.5  


 


Lymph % (Auto)  28.9  


 


Mono % (Auto)  4.7  


 


Eos % (Auto)  1.4  


 


Baso % (Auto)  0.5  


 


Absolute Neuts (auto)  3.7  


 


Absolute Lymphs (auto)  1.7  


 


Absolute Monos (auto)  0.3  


 


Absolute Eos (auto)  0.1  


 


Absolute Basos (auto)  0  


 


Absolute Nucleated RBC  0  


 


Nucleated RBC %  0.1  


 


APTT    33.0


 


Sodium   135 


 


Potassium   3.4 L 


 


Chloride   101 


 


Carbon Dioxide   29 


 


Anion Gap   5 


 


BUN   10 


 


Creatinine   0.82 


 


Est GFR ( Amer)   139.2 


 


Est GFR (Non-Af Amer)   108.2 


 


BUN/Creatinine Ratio   12.2 


 


Glucose   92 


 


Calcium   8.8 


 


Phosphorus   4.0 


 


Magnesium   1.4 L 

















Assessment:


POD#3s/p lap sigmoidectomy.  Overall doing well.


increased pain due to weaning CEI.








Plan:


D/w Dr. Pacheco, will add oxycodone and try to wean CEI later today.


Cont PCA.


Cont diet.


Mg IV.


Anticipate d/c 1-2 days.

## 2018-01-21 VITALS — SYSTOLIC BLOOD PRESSURE: 119 MMHG | DIASTOLIC BLOOD PRESSURE: 66 MMHG

## 2018-01-21 RX ADMIN — DEXTROSE MONOHYDRATE AND SODIUM CHLORIDE SCH MLS/HR: 5; .45 INJECTION, SOLUTION INTRAVENOUS at 05:18

## 2018-01-21 RX ADMIN — OXYCODONE HYDROCHLORIDE PRN MG: 5 CAPSULE ORAL at 07:55

## 2018-01-21 RX ADMIN — HEPARIN SODIUM SCH UNITS: 5000 INJECTION INTRAVENOUS; SUBCUTANEOUS at 05:14

## 2018-01-21 RX ADMIN — MORPHINE SULFATE SCH MLS/HR: 5 INJECTION, SOLUTION INTRAVENOUS at 01:35

## 2018-01-21 RX ADMIN — OXYCODONE HYDROCHLORIDE PRN MG: 5 CAPSULE ORAL at 11:57

## 2018-01-21 RX ADMIN — DEXTROAMPHETAMINE SACCHARATE, AMPHETAMINE ASPARTATE, DEXTROAMPHETAMINE SULFATE AND AMPHETAMINE SULFATE SCH MG: 2.5; 2.5; 2.5; 2.5 TABLET ORAL at 07:54

## 2018-01-21 RX ADMIN — FAMOTIDINE SCH MG: 10 INJECTION, SOLUTION INTRAVENOUS at 07:54

## 2018-01-21 RX ADMIN — ACETAMINOPHEN SCH MG: 325 TABLET ORAL at 05:14

## 2018-01-21 RX ADMIN — OXYCODONE HYDROCHLORIDE PRN MG: 5 CAPSULE ORAL at 03:18

## 2018-07-04 ENCOUNTER — HOSPITAL ENCOUNTER (EMERGENCY)
Dept: HOSPITAL 25 - ED | Age: 33
Discharge: HOME | End: 2018-07-04
Payer: COMMERCIAL

## 2018-07-04 VITALS — SYSTOLIC BLOOD PRESSURE: 98 MMHG | DIASTOLIC BLOOD PRESSURE: 53 MMHG

## 2018-07-04 DIAGNOSIS — Z83.3: ICD-10-CM

## 2018-07-04 DIAGNOSIS — G47.419: ICD-10-CM

## 2018-07-04 DIAGNOSIS — Z88.5: ICD-10-CM

## 2018-07-04 DIAGNOSIS — R11.0: ICD-10-CM

## 2018-07-04 DIAGNOSIS — G47.30: ICD-10-CM

## 2018-07-04 DIAGNOSIS — F32.9: ICD-10-CM

## 2018-07-04 DIAGNOSIS — M54.2: Primary | ICD-10-CM

## 2018-07-04 DIAGNOSIS — J45.909: ICD-10-CM

## 2018-07-04 DIAGNOSIS — K21.9: ICD-10-CM

## 2018-07-04 DIAGNOSIS — Z83.79: ICD-10-CM

## 2018-07-04 DIAGNOSIS — Z87.891: ICD-10-CM

## 2018-07-04 DIAGNOSIS — R51: ICD-10-CM

## 2018-07-04 PROCEDURE — 96372 THER/PROPH/DIAG INJ SC/IM: CPT

## 2018-07-04 PROCEDURE — 72050 X-RAY EXAM NECK SPINE 4/5VWS: CPT

## 2018-07-04 PROCEDURE — 99283 EMERGENCY DEPT VISIT LOW MDM: CPT

## 2018-07-04 NOTE — XMS REPORT
Ally Moore

 Created on:2018



Patient:Ally Moore JACOBO

Sex:Male

:1985

External Reference #:[id number]





Demographics







 Address  [address]



   [address]



   [place] [address]

 

 Mobile Phone  [phone]

 

 Work Phone  [phone]

 

 Email Address  [email or web address]

 

 Preferred Language  English

 

 Marital Status  Not  Or 

 

 Anabaptist Affiliation  Unknown

 

 Race  White

 

 Ethnic Group  Not  Or 









Author







 Organization  [facility]

 

 Address  [address]



   [place] [address]

 

 Phone  [phone]









Support







 Name  Relationship  Address  Phone

 

 Natividad Guzman  Unavailable  Unavailable  [phone]









Care Team Providers







 Name  Role  Phone

 

 Brian Garcia MD  Primary Care Physician  Unavailable









Payers







 Type  Date  Identification Numbers  Payment Provider  Subscriber

 

 Commercial    Policy Number: [id number]  [place]  Ally Moore









 Group Name: [id number]  [address]

 

 PayID: [id number]  [place] [address]







Problems







 Date  Description  Provider  Status

 

 Onset: 2015  Diverticular disease of colon  Brian Garcia M.D.,FACP
  Active

 

 Onset: 2015  Gout  Brian Garcia M.D.,FACP  Active

 

 Onset: 2015  Narcolepsy  Brian Garcia M.D.,FACP  Active

 

 Onset: 2016  Obstructive sleep apnea syndrome  Kirsten Howard MD  Active

 

 Onset: 2016  Morbid obesity  Kirsten Howard MD  Active

 

 Onset: 2016  Ex-smoker  Brian Garcia M.D.,FACP  Active

 

 Onset: 2017  History of bariatric surgical  Brian Garcia M.D.,FACP
  Active



   procedure    

 

 Onset: 10/06/2017  Body mass index 30+ - obesity  Brian Garcia M.D.,FACP
  Active

 

 Onset: 2018  Opioid abuse  Brian Garcia M.D.,FACP  Active

 

 Onset: 2018  Cocaine abuse  Brian Garcia M.D.,FACP  Active









   Note: in remission









 Onset: 2015  Tobacco user  Brian Garcia M.D.,FACP  Inactive









 Inactive: 2016









 Onset: 2015  Body mass index 40+ -  Brian Garcia M.D.,FACP  
Resolved



   severely obese    









 Resolved: 10/06/2017







Family History







 Date  Family Member(s)  Problem(s)  Comments

 

   Father  Diabetes Type II  

 

   Father  Chronic Obstructive Pulmonary Disease (COPD)  

 

   Father  Asthma  

 

   Father  Diverticulitis  

 

   Mother  Ovarian Cancer  possible

 

   Siblings  1  1/2 sister

 

   First Sister  Obesity  

 

   First Sister  Mental Illness NOS  

 

   Paternal Grandmother  Parkinson's Disease  

 

   Paternal Grandmother  Cancer  







Social History







 Type  Date  Description  Comments

 

 Marital Status    Single  

 

 Marital Status      

 

 Lives With    Son  

 

 Occupation    Currently Working  [employer]

 

 Cigarette Use    Former Cigarette Smoker  Began at 16, quit at 31



       

 

 ETOH Use  2017  Rarely consumes alcohol  

 

 Recreational Drug Use    Denies Drug Use  

 

 Smoking    Heavy tobacco smoker (more  



     than 10 cigarettes/day)  

 

 Daily Caffeine    Does Not Consume Caffeine  

 

 Exercise Type/Frequency    Exercises regularly  walking daily and



       situps, walked up



       [place]

 

 General Hx Text      12 yo son







Allergies, Adverse Reactions, Alerts







 Date  Description  Reaction  Status  Severity  Comments

 

 2015  Codeine  trouble breathing  active  Severe  

 

 2014  NKDA    inactive    







Medications







 Medication  Date  Status  Form  Strength  Qnty  SIG  Indications  Ordering



                 Provider

 

 Suboxone  /  Active  Film  8-2mg  14uni  1 strip sl  F14.10  Brian



   2018        ts  twice day    KIMBERLY Garcia M.D.,FACP

 

 Desvenlafaxine  /  Active  Tablets ER  25mg  30tab  1 po qd    Brian



 Succinate ER      24HR    s      KIMBERLY Garcia M.D.,FACP

 

 Omeprazole  /  Active  Capsules  20mg  30cap  1 by mouth  K21.9  Zsofia



       DR    s  every day    HEATHER Donahue

 

 Knee Support  /  Active  Misc    1unit  wear on  M25.562  Magnus



           s  left knee    French,



             as needed    NP



             for knee    



             support    

 

 Zofran Odt  07/15/  Active  Tablets  4mg  60tab  every 4 to    Zsofia



   2016    Dispers    s  6 hours as    Godfrey,



             needed    SUMANTHP

 

 Vitamin B12  00/  Active  Liquid      dissolving    Unknown



   0000          tablets one    



             per day    

 

 Ibuprofen  00/  Active  Tablets  400mg    by mouth    Unknown



   0000          every 4 to    



             6 hours as    



             needed    

 

 Hydroxyzine HCL  00/00/  Active  Tablets  50mg    take 1 tab    Unknown



   0000          by mouth 4x    



             daily for    



             anxiety as    



             needed    

 

 Clonidine HCL  /  Active  Tablets  0.1mg    1 by mouth    Unknown



   0000          four times    



             daily    

 

                 

 

 Omeprazole  /  Hx  Capsules  20mg  30cap  1 by mouth  K21.9  Brannon thao  every day    Godfrey,



   /              FNP



   2018              

 

 Zofran Odt  /  Hx  Tablets  4mg  60tab  every 4 to  R11.0  Brannon



   2018 -    Frandy thao  6 hours as    Godfrey



   /          needed    FNP



   2018              

 

 Peg  /  Hx  Solution  240gm  4000m  as directed    Kinsey Nieves



 3350/Electrolyte  2018    Rec    l  the day    Shoaib,



 s            before    MD



             surgery    

 

 Flagyl  /  Hx  Tablets  500mg  3tabs  1 tab by    Kinsey Nieves



   2018          mouth at    Larned,



             1:00 at    MD



             night &    



             7:00 at    



             night the    



             day before    



             surgery and    



             at 7:00 in    



             the morning    



             the day of    



             surgery    

 

 Neomycin Sulfate  /  Hx  Tablets  500mg  6tabs  2 tabs by    Kinsey Nieves



   2018          mouth at    Larned,



             1:00 at    MD



             night &    



             7:00 at    



             night the    



             day before    



             surgery,    



             and at 7:00    



             in the    



             morning the    



             day of    



             surgery    

 

 Tramadol HCL  10/06/  Hx  Tablets  50mg  80tab  four times  M54.5  Brian



   2017 -        s  a day as    KIMBERLY Garcia,



   /          needed    MLEX,FACP



   2018              









 G89.4









 Metronidazole  10/02/2017  Hx  Tablets  500mg  21tabs  one tablet by  K57.32  
Ally P.



   -          mouth 3 times    MD Florentino



   2017          daily for 7    



             days    

 

 Ciprofloxacin  10/02/2017  Hx  Tablets  500mg  14tabs  1 tab by mouth  K57.32  
Ally P.



 HCL  -          twice a day x 7    MD Florentino



   2017          days    

 

 Gabapentin  2017  Hx  Capsules  100mg  270caps  2 tab by mouth    Jaime Mathis



             three times a    EDU. Jose,



             day plus 3 tabs    M.DBrett,FACP



             every night at    



             bedtime    

 

 Ciprofloxacin  2017  Hx  Tablets  500mg  28tabs  1 tablet PO q12  K57.32
  Ally P.



 HCL  -          hrs x 2 weeks    MD Florentino



   2017              

 

 Flagyl  2017  Hx  Tablets  500mg  42tabs  1 tablet by  K57.32  Ally EVANS



   -          mouth every 8    MD Florentino



   2017          hours x 2weeks    

 

 Amoxicillin/Cla  2016  Hx  Suspension  400-57  225ml  11.25ML bid For    
Kaci



 vulanate  -    Rec  mg/5ML    10 Days    B.



 Potassium  2017              VERNA Stevens

 

 Boost Calorie  2016  Hx  Liquid    75units  8 oz PO 5x/day  E66.01  Jaime Garcia M.D.,FACP

 

 Zofran  07/15/2016  Hx  Tablets  4mg  30tabs  1 tab by mouth    Magnus



   -          every 6 hours    VERNA Mann



   07/15/2016          as needed    



             nausea    

 

 Metoprolol  07/15/2016  Hx  Tablets ER  25mg  30tabs  1 by mouth  I10  Magnus



 Succinate ER      24HR      every day    VERNA Mann

 

 Lisinopril  2016  Hx  Tablets  10mg  30tabs  1 by mouth  I10  Magnus



             every day    VERNA Mann

 

 Nicoderm CQ  2016  Hx  Patches  14mg/2  28units  1 topical every  
F17.210  Magnus



   -    24HR  4HR    day-still    VERNA Mann



   2016          planning on    



             quitting    

 

 Augmentin  2016  Hx  Tablets  875-12  14tabs  by mouth twice    Biran



   -      5mg    a day as needed    KIMBERLY Garcia,



   2016          for    M.DBrett,FACP



             diverticulitis    

 

 Zofran Odt  2016  Hx  Tablets  4mg  60tabs  every 4 to 6    Brian



   -    Dispers      hours as needed    KIMBERLY Garcia,



   2016              M.D.,FACP

 

 Augmentin  2016  Hx  Tablets  875-12  14tabs  by mouth twice    Brian



   -      5mg    a day    KIMBERLY Garcia,



   2016              M.D.,FACP

 

 Chantix  2016  Hx  Tablets  1mg  60tabs  take one tablet  Z72.0  Vinnie-
Del



   -          by mouth twice    KIMBERLY Garcia,



   2016          a day after    M.D.,FACP



             starter pack    

 

 Zofran  2015  Hx  Tablets  4mg  20tabs  1 tab by mouth    Brian



   -          every 6 hours    KIMBERLY Garcia,



   2016          as needed    M.D.,FACP



             nausea    

 

 Ciprofloxacin  2015  Hx  Tablets  500mg    1 tab by mouth    Unknown



 HCL  -          twice a day x    



   2016          10 days    

 

 Metronidazole  2015  Hx  Tablets  500mg    one tablet by    Unknown



   -          mouth 3 times    



   2016          daily for 10    



             days    

 

 Norco  2013  Hx  Tablets  5-325m  40tabs  1po q4-6 hr prn    Nicole



   -      g    pain    Nunez,



   2013              M.D.

 

 Ibuprofen  2013  Hx  Tablets  600mg  120tabs  1 po qid prn    Nicole



   -              Nunez,



   2016              M.D.

 

 Percocet  2013  Hx  Tablets  5-325m  60tabs  1-2 po q4-6h    Nicole



   -      g    prn pain    Nunez,



   2013              M.D.

 

 Ultracet  2013  Hx  Tablets  37.5-3  30tabs  1 po q6hr prn    Nicole



   -      25mg    pain    Nunez,



   2013              M.D.

 

 Vicodin    Hx  Tablets  5-300m  20tabs  take 1 by mouth    Unknown



   -      g    every 6 hours    



   2015          as needed    

 

 Omeprazole    Hx  Capsules DR  20mg  90caps  1 by mouth    Unknown



   -          every day    



   2015              

 

 Adderall    Hx  Tablets  20mg  90tabs  1 by mouth    Brian



   -          three times a    D. Jose,



   2018          day    M.D.,FACP

 

 Oxycodone-Aceta    Hx  Tablets  5-325m  20tabs  one tab by    Jaime Mathis



 minophen  -      g    mouth every 8    D. Jose,



   2018          hours as needed    M.D.,FACP



             for pain    

 

 Boost Calorie    Hx  Liquid      8 oz by mouth    Unknown



 Smart            1x/day    

 

 Flintstones    Hx  Chewtabs  60mg    1 once daily    Unknown



 Complete  -              



   2018              

 

 Trazodone HCL    Hx  Tablets  50mg    1 tablet at    Unknown



   -          bedtime as    



   2018          needed    

 

 Clonidine HCL    Hx  Patches  0.1mg/    1 patch every    Unknown



   -    Weekly  24HR    week ()    



   2018              

 

 Aripiprazole    Hx  Tablets  2mg    1 by mouth    Unknown



   -          every day    



   2018              

 

 Buprenorphine    Hx  Tablets Sub  2mg    2 tabs by mouth  F14.10  
Unknown



 HCL  -          three times a    



   2018          day for 1 week    

 

 Adderall    Hx  Tablets  30mg    1 by mouth once    Unknown



   -          a day    



   2018              

 

 Desvenlafaxine    Hx  Tablets ER  25mg        Unknown



 Succinate ER  -    24HR          



   2018              

 

 Suboxone    Hx  Film  12-3mg    1 SL Film Once  F14.10  Unknown



   -          Daily    



   2018              







Immunizations







 CPT Code  Status  Date  Vaccine  Reaction  Lot #

 

 [id number]  Given  2017  Influenza Virus Vaccine,  no immediate reaction,  
[id number]



       Quadrivalent, Split,  pt tolerated well  



       Preservative Free    

 

 88285  Given  2017  Tdap -    [id number]



       Tetanus/Diptheria/Acellular    



       Pertussis    

 

 50820  Given  2016  Influenza Virus Vaccine,    [id number]



       Quadrivalent, Split Virus,    



       Im Use    

 

 83076  Given  2016  Pneumonia Vaccine    [id number]

 

 09241  Given  2016  Influenza Virus Vaccine,    [id number]



       Quadrivalent, Split,    



       Preservative Free    







Vital Signs







 Date  Vital  Result  Comment

 

 2018  Height  64 inches  5'4"









 Weight  175.00 lb  

 

 Heart Rate  91 /min  

 

 BP Systolic Sitting  130 mmHg  

 

 BP Diastolic Sitting  78 mmHg  

 

 Body Temperature  97.3 F  

 

 O2 % BldC Oximetry  90 %  

 

 BMI (Body Mass Index)  30.0 kg/m2  









 2018  Height  64 inches  5'4"









 Weight  173.12 lb  

 

 Heart Rate  72 /min  

 

 BP Systolic Sitting  138 mmHg  

 

 BP Diastolic Sitting  80 mmHg  

 

 O2 % BldC Oximetry  96 %  

 

 BMI (Body Mass Index)  29.7 kg/m2  









 2018  Height  64 inches  5'4"









 Weight  174.00 lb  

 

 Heart Rate  82 /min  

 

 BP Systolic  124 mmHg  

 

 BP Diastolic  80 mmHg  

 

 Respiratory Rate  16 /min  

 

 Body Temperature  97.3 F  

 

 BMI (Body Mass Index)  29.9 kg/m2  









 2018  Height  64 inches  5'4"









 Weight  171.00 lb  

 

 Heart Rate  68 /min  

 

 BP Systolic  117 mmHg  

 

 BP Diastolic  66 mmHg  

 

 Respiratory Rate  16 /min  

 

 Body Temperature  96.7 F  

 

 BMI (Body Mass Index)  29.3 kg/m2  









 2017  Weight  166.00 lb  









 Heart Rate  82 /min  

 

 BP Systolic Sitting  138 mmHg  

 

 BP Diastolic Sitting  72 mmHg  

 

 Body Temperature  96.6 F  

 

 O2 % BldC Oximetry  97 %  









 2017  Height  64 inches  5'4"









 Weight  178.12 lb  

 

 Heart Rate  60 /min  

 

 BP Systolic  144 mmHg  

 

 BP Diastolic  82 mmHg  

 

 Body Temperature  98.0 F  

 

 O2 % BldC Oximetry  96 %  

 

 BMI (Body Mass Index)  30.6 kg/m2  









 10/06/2017  Weight  190.00 lb  









 Heart Rate  70 /min  

 

 BP Systolic Sitting  126 mmHg  

 

 BP Diastolic Sitting  82 mmHg  

 

 Body Temperature  97.7 F  

 

 O2 % BldC Oximetry  96 %  









 10/02/2017  Height  64 inches  5'4"









 Weight  190.00 lb  

 

 Heart Rate  72 /min  

 

 BP Systolic  138 mmHg  

 

 BP Diastolic  86 mmHg  

 

 Respiratory Rate  16 /min  

 

 Body Temperature  97.6 F  

 

 BMI (Body Mass Index)  32.6 kg/m2  









 2017  Height  64 inches  5'4"









 Weight  193.00 lb  

 

 Heart Rate  66 /min  

 

 BP Systolic  100 mmHg  

 

 BP Diastolic  66 mmHg  

 

 Respiratory Rate  16 /min  

 

 Body Temperature  97.1 F  

 

 BMI (Body Mass Index)  33.1 kg/m2  









 2017  Height  64 inches  5'4"









 Weight  227.00 lb  

 

 Heart Rate  72 /min  

 

 BP Systolic Sitting  122 mmHg  

 

 BP Diastolic Sitting  86 mmHg  

 

 Respiratory Rate  16 /min  

 

 Body Temperature  98.4 F  

 

 BMI (Body Mass Index)  39.0 kg/m2  









 2017  Height  64 inches  5'4"









 Weight  227.00 lb  

 

 BMI (Body Mass Index)  39.0 kg/m2  









 2017  Height  64 inches  5'4"









 Weight  233.00 lb  

 

 Heart Rate  90 /min  

 

 BP Systolic  146 mmHg  

 

 BP Diastolic  82 mmHg  

 

 Body Temperature  96.1 F  

 

 O2 % BldC Oximetry  98 %  

 

 BMI (Body Mass Index)  40.0 kg/m2  









 2017  Height  64 inches  5'4"









 Weight  264.00 lb  

 

 Heart Rate  76 /min  

 

 BP Systolic  140 mmHg  

 

 BP Diastolic  90 mmHg  

 

 Respiratory Rate  18 /min  

 

 Body Temperature  97.2 F  

 

 BMI (Body Mass Index)  45.3 kg/m2  









 2017  Height  64 inches  5'4"









 Weight  264.25 lb  

 

 Heart Rate  83 /min  

 

 BP Systolic Sitting  118 mmHg  

 

 BP Diastolic Sitting  82 mmHg  

 

 Body Temperature  97.0 F  

 

 O2 % BldC Oximetry  97 %  

 

 BMI (Body Mass Index)  45.4 kg/m2  









 2017  Height  63.5 inches  5'3.50"









 Weight  284.00 lb  

 

 Heart Rate  78 /min  

 

 BP Systolic  120 mmHg  

 

 BP Diastolic  84 mmHg  

 

 Respiratory Rate  18 /min  

 

 Body Temperature  98.1 F  

 

 BMI (Body Mass Index)  49.5 kg/m2  









 2017  Height  63.5 inches  5'3.50"









 Weight  296.00 lb  

 

 Heart Rate  78 /min  

 

 BP Systolic  122 mmHg  

 

 BP Diastolic  88 mmHg  

 

 Respiratory Rate  18 /min  

 

 Body Temperature  97.1 F  

 

 BMI (Body Mass Index)  51.6 kg/m2  









 2016  Height  63.5 inches  5'3.50"









 Weight  297.38 lb  

 

 Heart Rate  80 /min  

 

 BP Systolic Sitting  128 mmHg  

 

 BP Diastolic Sitting  74 mmHg  

 

 Body Temperature  96.8 F  

 

 BMI (Body Mass Index)  51.8 kg/m2  









 2016  Height  63.5 inches  5'3.50"









 Weight  299.00 lb  

 

 Heart Rate  84 /min  

 

 Respiratory Rate  18 /min  

 

 Body Temperature  98.6 F  

 

 BMI (Body Mass Index)  52.1 kg/m2  









 2016  Height  63.5 inches  5'3.50"









 Weight  301.00 lb  

 

 Heart Rate  98 /min  

 

 BP Systolic  140 mmHg  

 

 BP Diastolic  88 mmHg  

 

 Respiratory Rate  18 /min  

 

 Body Temperature  98.6 F  

 

 BMI (Body Mass Index)  52.5 kg/m2  









 2016  Height  63.5 inches  5'3.50"









 Weight  318.00 lb  

 

 Heart Rate  88 /min  

 

 BP Systolic Sitting  132 mmHg  

 

 BP Diastolic Sitting  88 mmHg  

 

 Respiratory Rate  18 /min  

 

 Body Temperature  98.3 F  

 

 BMI (Body Mass Index)  55.4 kg/m2  









 2016  Weight  350.00 lb  with shoes









 Heart Rate  101 /min  

 

 BP Systolic Sitting  110 mmHg  

 

 BP Diastolic Sitting  80 mmHg  

 

 O2 % BldC Oximetry  98 %  









 10/27/2016  Height  64 inches  5'4"









 Weight  353.00 lb  

 

 Heart Rate  84 /min  

 

 BP Systolic  132 mmHg  

 

 BP Diastolic  80 mmHg  

 

 Body Temperature  98.0 F  

 

 BMI (Body Mass Index)  60.6 kg/m2  









 2016  Height  63.5 inches  5'3.50"









 Weight  352.38 lb  

 

 Heart Rate  96 /min  

 

 BP Systolic Sitting  124 mmHg  

 

 BP Diastolic Sitting  80 mmHg  

 

 Respiratory Rate  16 /min  

 

 Body Temperature  98.6 F  

 

 Pain Level  2  left leg

 

 BMI (Body Mass Index)  61.4 kg/m2  









 2016  Height  63.5 inches  5'3.50"









 Weight  347.00 lb  

 

 Heart Rate  94 /min  

 

 BP Systolic Sitting  142 mmHg  

 

 BP Diastolic Sitting  80 mmHg  

 

 O2 % BldC Oximetry  96 %  

 

 BMI (Body Mass Index)  60.5 kg/m2  









 07/15/2016  Height  63.5 inches  5'3.50"









 Weight  347.00 lb  

 

 Heart Rate  120 /min  Recheck 114

 

 BP Systolic Sitting  142 mmHg  Recheck bp 140/110

 

 BP Diastolic Sitting  70 mmHg  Recheck bp 140/110

 

 O2 % BldC Oximetry  96 %  

 

 BMI (Body Mass Index)  60.5 kg/m2  









 2016  Height  63.5 inches  5'3.50"









 Weight  353.00 lb  

 

 Heart Rate  93 /min  

 

 BP Systolic Sitting  150 mmHg  

 

 BP Diastolic Sitting  92 mmHg  

 

 Respiratory Rate  18 /min  

 

 O2 % BldC Oximetry  95 %  

 

 BMI (Body Mass Index)  61.5 kg/m2  









 2016  Height  63.5 inches  5'3.50"









 Weight  351.00 lb  

 

 Heart Rate  86 /min  

 

 BP Systolic Sitting  148 mmHg  

 

 BP Diastolic Sitting  92 mmHg  

 

 Body Temperature  98.4 F  

 

 O2 % BldC Oximetry  95 %  

 

 BMI (Body Mass Index)  61.2 kg/m2  









 2016  Height  63.5 inches  5'3.50"









 Weight  330.00 lb  

 

 Heart Rate  85 /min  

 

 BP Systolic Sitting  146 mmHg  

 

 BP Diastolic Sitting  90 mmHg  

 

 Respiratory Rate  20 /min  

 

 O2 % BldC Oximetry  95 %  

 

 BMI (Body Mass Index)  57.5 kg/m2  









 2016  Height  63.5 inches  5'3.50"









 Weight  322.00 lb  

 

 Heart Rate  80 /min  

 

 BP Systolic Sitting  142 mmHg  

 

 BP Diastolic Sitting  92 mmHg  

 

 Body Temperature  97.8 F  

 

 O2 % BldC Oximetry  96 %  

 

 BMI (Body Mass Index)  56.1 kg/m2  









 2016  Height  63.5 inches  5'3.50"









 Weight  331.38 lb  

 

 Heart Rate  89 /min  

 

 BP Systolic  136 mmHg  

 

 BP Diastolic  90 mmHg  

 

 Respiratory Rate  20 /min  

 

 O2 % BldC Oximetry  97 %  

 

 BMI (Body Mass Index)  57.8 kg/m2  

 

 Neck Circumference in inches  19  









 2016  Height  63.5 inches  5'3.50"









 Weight  329.00 lb  

 

 Heart Rate  100 /min  

 

 BP Systolic Sitting  132 mmHg  

 

 BP Diastolic Sitting  90 mmHg  

 

 Body Temperature  97.8 F  

 

 O2 % BldC Oximetry  94 %  

 

 BMI (Body Mass Index)  57.4 kg/m2  









 2015  Height  63.5 inches  5'3.50"









 Weight  332.25 lb  

 

 Heart Rate  96 /min  

 

 BP Systolic Sitting  110 mmHg  

 

 BP Diastolic Sitting  78 mmHg  

 

 Body Temperature  98.0 F  

 

 O2 % BldC Oximetry  95 %  

 

 BMI (Body Mass Index)  57.9 kg/m2  









 2014  Height  64 inches  5'4"









 Weight  355.00 lb  

 

 Heart Rate  85 /min  

 

 BP Systolic  137 mmHg  

 

 BP Diastolic  84 mmHg  

 

 BMI (Body Mass Index)  60.9 kg/m2  







Results







 Test  Date  Test  Result  H/L  Range  Note

 

 Laboratory test  2018  Surgical Pathology  SEE RESULT BELOW      1



 finding            

 

 CBC Auto Diff  2018  White Blood Count  3.9 10^3/uL    3.5-10.8  2









 Red Blood Count  3.84 10^6/uL  Low  4.0-5.4  2

 

 Hemoglobin  13.2 g/dL  Low  14.0-18.0  2

 

 Hematocrit  39 %  Low  42-52  2

 

 Mean Corpuscular Volume  101 fL  High  80-94  2

 

 Mean Corpuscular Hemoglobin  35 pg  High  27-31  2

 

 Mean Corpuscular HGB Conc  34 g/dL    31-36  2

 

 Red Cell Distribution Width  13 %    10.5-15  2

 

 Platelet Count  124 10^3/uL  Low  150-450  2

 

 Mean Platelet Volume  8 um3    7.4-10.4  2

 

 Abs Neutrophils  2.1 10^3/uL    1.5-7.7  2

 

 Abs Lymphocytes  1.4 10^3/uL    1.0-4.8  2

 

 Abs Monocytes  0.3 10^3/uL    0-0.8  2

 

 Abs Eosinophils  0.1 10^3/uL    0-0.6  2

 

 Abs Basophils  0 10^3/uL    0-0.2  2

 

 Abs Nucleated RBC  0 10^3/uL      2

 

 Granulocyte %  54.8 %    38-83  2

 

 Lymphocyte %  34.8 %    25-47  2

 

 Monocyte %  6.9 %    1-9  2

 

 Eosinophil %  2.3 %    0-6  2

 

 Basophil %  1.2 %    0-2  2

 

 Nucleated Red Blood Cells %  0.1      2









 Type & Screen  2018  Patient Blood Type  O Positive      2









 Antibody Screen  NEGATIVE      2









 Comp Metabolic Panel  2018  Sodium  139 mmol/L    133-145  2









 Potassium  4.1 mmol/L    3.5-5.0  2

 

 Chloride  104 mmol/L    101-111  2

 

 Co2 Carbon Dioxide  31 mmol/L    22-32  2

 

 Anion Gap  4 mmol/L    2-11  2

 

 Glucose  96 mg/dL      2

 

 Blood Urea Nitrogen  12 mg/dL    6-24  2

 

 Creatinine  0.65 mg/dL  Low  0.67-1.17  2

 

 BUN/Creatinine Ratio  18.5    8-20  2

 

 Calcium  9.5 mg/dL    8.6-10.3  2

 

 Total Protein  6.2 g/dL  Low  6.4-8.9  2

 

 Albumin  4.0 g/dL    3.2-5.2  2

 

 Globulin  2.2 g/dL    2-4  2

 

 Albumin/Globulin Ratio  1.8    1-3  2

 

 Total Bilirubin  0.60 mg/dL    0.2-1.0  2

 

 Alkaline Phosphatase  64 U/L      2

 

 Alt  14 U/L    7-52  2

 

 Ast  16 U/L    13-39  2

 

 Egfr Non-  141.5    >60  2

 

 Egfr   181.9    >60  2, 3









 Iron & Iron Binding Capacity  2018  Iron  89 g/dL      2









 Unsaturated Iron Binding  157 g/dL      2

 

 Total Iron Binding Capacity  246 g/dL  Low  250-450  2

 

 % Iron Saturation  36 %    15-55  2









 Laboratory test finding  2018  Ferritin  157.3 ng/mL      2, 4









 Folic Acid (Folate)  10.83 ng/mL    >3.99  2, 5

 

 Vitamin B12  405 pg/mL    180-914  2, 6

 

 Vitamin D Total 25(Oh)  20.1 ng/mL    20-50  2, 7

 

 Vitamin E Level  8.4 mg/L    5.5 - 17.0  2, 8

 

 Vitamin B1 (Whole Blood)  124 nmol/L      2, 9









 Urine Amphetamine  10/06/2017  Urine Amphetamine by  1193 ng/mL    Cutoff: 25  



 Confirm    GC/MS        









 Urine Methamphetamine by GC/MS  Negative ng/mL    Cutoff: 25  

 

 Phentermine-by GC/MS  Negative ng/mL    Cutoff: 25  

 

 Pseudoephedrine/Ephedr GC/MS  Negative ng/mL    Cutoff: 25  

 

 Mda(Ecstacy metabolite) GC/MS  Negative ng/mL    Cutoff: 25  

 

 Mdma(Ecstacy)-by GC/MS  Negative ng/mL    Cutoff: 25  

 

 Urine Amphetamines Interp  Positive.      10









 THC Confirmation Urine  10/06/2017  Urine Carboxy THC Confirm  164 ng/mL      
11









 Urine THC Interpretation  Positive.      12









 Drug Abuse 20 Urine  10/06/2017  Urine Amphetamine  Presumptive Posi <SEE NOTE
>      13



       ng/mL      









 Urine Barbiturates  Negative ng/mL      14

 

 Urine Benzodiazepines  Negative ng/mL      15

 

 Urine Cocaine  Negative ng/mL      16

 

 Urine Phencyclidine  Negative ng/mL    Cutoff: 25  

 

 Urine Tetrahydrocannabinol  Presumptive Posi <SEE NOTE> ng/mL    Cutoff: 50  17

 

 Creatinine, Urine  36.9 mg/dL      

 

 Specific Gravity  1.006      

 

 pH  6.6      

 

 Oxidants  Negative      18

 

 Adulterants Comment  Normal      

 

 Codeine, Ur  Not Detected ng/mL    Cutoff: 25  19

 

 Codeine-6-beta-glucuronide, Ur  Not Detected ng/mL      20

 

 Morphine, Ur  Not Detected ng/mL    Cutoff: 25  21

 

 Morphine-6-beta-glucuronide, U  Not Detected ng/mL      22

 

 6-monoacetylmorphine, Ur  Not Detected ng/mL    Cutoff: 25  23

 

 Hydrocodone, Ur  Not Detected ng/mL    Cutoff: 25  24

 

 Norhydrocodone, Ur  Not Detected ng/mL    Cutoff: 25  25

 

 Dihydrocodeine, Ur  Not Detected ng/mL    Cutoff: 25  26

 

 Hydromorphone, Ur  Not Detected ng/mL    Cutoff: 25  27

 

 Mtexudkzlyfde2yxusmlincuyxoqt  Not Detected ng/mL      28

 

 Oxycodone, Ur  Present ng/mL    Cutoff: 25  29

 

 Noroxycodone, Ur  Present ng/mL    Cutoff: 25  30

 

 Oxymorphone, Ur  Not Detected ng/mL    Cutoff: 25  31

 

 Oxymorphone-3-beta-glucuronide  Present ng/mL      32

 

 Noroxymorphone, Ur  Present ng/mL    Cutoff: 25  33

 

 Fentanyl, Ur  Not Detected ng/mL    Cutoff: 2  34

 

 Norfentanyl, Ur  Not Detected ng/mL    Cutoff: 2  35

 

 Meperidine, Ur  Not Detected ng/mL    Cutoff: 25  36

 

 Normeperidine, Ur  Not Detected ng/mL    Cutoff: 25  37

 

 Naloxone, Ur  Not Detected ng/mL    Cutoff: 25  38

 

 Naloxone-3-beta-glucuronide, U  Not Detected ng/mL      39

 

 Methadone, Ur  Not Detected ng/mL    Cutoff: 25  40

 

 Eddp, Ur  Not Detected ng/mL    Cutoff: 25  41

 

 Propoxyphene, Ur  Not Detected ng/mL    Cutoff: 25  42

 

 Norpropoxyphene, Ur  Not Detected ng/mL    Cutoff: 25  43

 

 Tramadol, Ur  Not Detected ng/mL    Cutoff: 25  44

 

 O-desmethyltramadol, Ur  Not Detected ng/mL    Cutoff: 25  45

 

 Tapentadol, Ur  Not Detected ng/mL    Cutoff: 25  46

 

 N-desmethyltapentadol, Ur  Not Detected ng/mL    Cutoff: 50  47

 

 Tapentadol-beta-glucuronide, U  Not Detected ng/mL      48

 

 Buprenorphine, Ur  Not Detected ng/mL    Cutoff: 5  49

 

 Norbuprenorphine, Ur  Not Detected ng/mL    Cutoff: 5  50

 

 Norbuprenorphine glucuronide  Not Detected ng/mL    Cutoff: 20  51

 

 Opioid Interpretation  See Comment      52









 CBC Auto Diff  2017  White Blood Count  4.8 10^3/uL    3.5-10.8  









 Red Blood Count  3.94 10^6/uL  Low  4.0-5.4  

 

 Hemoglobin  13.6 g/dL  Low  14.0-18.0  

 

 Hematocrit  40 %  Low  42-52  

 

 Mean Corpuscular Volume  101 fL  High  80-94  

 

 Mean Corpuscular Hemoglobin  35 pg  High  27-31  

 

 Mean Corpuscular HGB Conc  34 g/dL    31-36  

 

 Red Cell Distribution Width  14 %    10.5-15  

 

 Platelet Count  114 10^3/uL  Low  150-450  

 

 Mean Platelet Volume  9 um3    7.4-10.4  

 

 Abs Neutrophils  3.2 10^3/uL    1.5-7.7  

 

 Abs Lymphocytes  1.2 10^3/uL    1.0-4.8  

 

 Abs Monocytes  0.3 10^3/uL    0-0.8  

 

 Abs Eosinophils  0.1 10^3/uL    0-0.6  

 

 Abs Basophils  0 10^3/uL    0-0.2  

 

 Abs Nucleated RBC  0.01 10^3/uL      

 

 Granulocyte %  66.9 %    38-83  

 

 Lymphocyte %  24.7 %  Low  25-47  

 

 Monocyte %  5.9 %    1-9  

 

 Eosinophil %  1.6 %    0-6  

 

 Basophil %  0.9 %    0-2  

 

 Nucleated Red Blood Cells %  0.1      









 Laboratory test finding  2017  Lactic Acid  0.7 mmol/L    0.5-2.0  53

 

 Comp Metabolic Panel  2017  Sodium  139 mmol/L    133-145  









 Chloride  104 mmol/L    101-111  

 

 Co2 Carbon Dioxide  29 mmol/L    22-32  

 

 Glucose  92 mg/dL      

 

 Blood Urea Nitrogen  13 mg/dL    6-24  

 

 Creatinine  0.74 mg/dL    0.67-1.17  

 

 BUN/Creatinine Ratio  17.6    8-20  

 

 Calcium  9.6 mg/dL    8.6-10.3  

 

 Total Protein  6.8 g/dL    6.4-8.9  

 

 Albumin  4.1 g/dL    3.2-5.2  

 

 Globulin  2.7 g/dL    2-4  

 

 Albumin/Globulin Ratio  1.5    1-3  

 

 Total Bilirubin  0.70 mg/dL    0.2-1.0  

 

 Alkaline Phosphatase  46 U/L      

 

 Alt  9 U/L    7-52  

 

 Egfr Non-  121.8    >60  

 

 Egfr   156.7    >60  54

 

 Potassium  TNP mmol/L    3.5-5.0  

 

 Anion Gap  6 mmol/L    2-11  

 

 Ast  TNP U/L    13-39  









 Laboratory test finding  2017  Amylase  25 U/L  Low    









 Lipase  12 U/L    11.0-82.0  

 

 C Reactive Protein  5.79 mg/L  High  < 5.00  55









 Iron & Iron Binding Capacity  2017  Iron  63 g/dL      









 Unsaturated Iron Binding  231 g/dL      

 

 Total Iron Binding Capacity  294 g/dL    250-450  

 

 % Iron Saturation  21 %    15-55  









 CBC Auto Diff  2017  White Blood Count  7.3 10^3/uL    3.5-10.8  









 Red Blood Count  4.50 10^6/uL    4.0-5.4  

 

 Hemoglobin  15.1 g/dL    14.0-18.0  

 

 Hematocrit  45 %    42-52  

 

 Mean Corpuscular Volume  100 fL  High  80-94  

 

 Mean Corpuscular Hemoglobin  34 pg  High  27-31  

 

 Mean Corpuscular HGB Conc  33 g/dL    31-36  

 

 Red Cell Distribution Width  14 %    10.5-15  

 

 Platelet Count  136 10^3/uL  Low  150-450  

 

 Mean Platelet Volume  9 um3    7.4-10.4  

 

 Abs Neutrophils  4.8 10^3/uL    1.5-7.7  

 

 Abs Lymphocytes  1.7 10^3/uL    1.0-4.8  

 

 Abs Monocytes  0.6 10^3/uL    0-0.8  

 

 Abs Eosinophils  0.1 10^3/uL    0-0.6  

 

 Abs Basophils  0.1 10^3/uL    0-0.2  

 

 Abs Nucleated RBC  0 10^3/uL      

 

 Granulocyte %  65.3 %    38-83  

 

 Lymphocyte %  23.6 %  Low  25-47  

 

 Monocyte %  8.6 %    1-9  

 

 Eosinophil %  1.6 %    0-6  

 

 Basophil %  0.9 %    0-2  

 

 Nucleated Red Blood Cells %  0.1      









 Comp Metabolic Panel  2017  Sodium  139 mmol/L    133-145  









 Potassium  4.1 mmol/L    3.5-5.0  

 

 Chloride  104 mmol/L    101-111  

 

 Co2 Carbon Dioxide  30 mmol/L    22-32  

 

 Anion Gap  5 mmol/L    2-11  

 

 Glucose  85 mg/dL      

 

 Blood Urea Nitrogen  13 mg/dL    6-24  

 

 Creatinine  0.79 mg/dL    0.67-1.17  

 

 BUN/Creatinine Ratio  16.5    8-20  

 

 Calcium  9.1 mg/dL    8.6-10.3  

 

 Total Protein  6.5 g/dL    6.4-8.9  

 

 Albumin  4.1 g/dL    3.2-5.2  

 

 Globulin  2.4 g/dL    2-4  

 

 Albumin/Globulin Ratio  1.7    1-3  

 

 Total Bilirubin  0.60 mg/dL    0.2-1.0  

 

 Alkaline Phosphatase  58 U/L      

 

 Alt  11 U/L    7-52  

 

 Ast  11 U/L  Low  13-39  

 

 Egfr Non-  113.0    >60  

 

 Egfr   145.3    >60  56









 Bariatric Panel Post Op  2017  Ferritin  116.2 ng/mL      









 Vitamin B12  945 pg/mL  High  180-914  57

 

 Folic Acid (Folate)  9.71 ng/mL    >3.99  

 

 Vitamin D Total 25(Oh)  18.7 ng/mL  Low  30-50  

 

 Vitamin B1 (Whole Blood)  111 nmol/L      58

 

 Vitamin E Level  8.8 mg/L    5.5 - 17.0  59









 Urine Amphetamine  2017  Urine Amphetamine by  12230 ng/mL    Cutoff: 25
  



 Confirm    GC/MS        









 Urine Methamphetamine by GC/MS  Negative ng/mL    Cutoff: 25  

 

 Phentermine-by GC/MS  Negative ng/mL    Cutoff: 25  

 

 Pseudoephedrine/Ephedr GC/MS  Negative ng/mL    Cutoff: 25  

 

 Mda(Ecstacy metabolite) GC/MS  Negative ng/mL    Cutoff: 25  

 

 Mdma(Ecstacy)-by GC/MS  Negative ng/mL    Cutoff: 25  

 

 Urine Amphetamines Interp  Positive.      60









 THC Confirmation Urine  2017  Urine Carboxy THC Confirm  55 ng/mL      61









 Urine THC Interpretation  Positive.      62









 Drug Abuse 20 Urine  2017  Urine Amphetamine  Presumptive Posi <SEE NOTE
>      63



       ng/mL      









 Urine Barbiturates  Negative ng/mL      64

 

 Urine Benzodiazepines  Negative ng/mL      65

 

 Urine Cocaine  Negative ng/mL      66

 

 Urine Phencyclidine  Negative ng/mL    Cutoff: 25  

 

 Urine Tetrahydrocannabinol  Presumptive Posi <SEE NOTE> ng/mL    Cutoff: 50  67

 

 Creatinine  251.9 mg/dL      

 

 Specific Gravity  1.017      

 

 pH  5.7      

 

 Oxidants  Negative      68

 

 Adulterants Comment  Normal      

 

 Codeine, Ur  Not Detected ng/mL    Cutoff: 25  69

 

 Codeine-6-beta-glucuronide, Ur  Not Detected ng/mL      70

 

 Morphine, Ur  Not Detected ng/mL    Cutoff: 25  71

 

 Morphine-6-beta-glucuronide, U  Not Detected ng/mL      72

 

 6-monoacetylmorphine, Ur  Not Detected ng/mL    Cutoff: 25  73

 

 Hydrocodone, Ur  Not Detected ng/mL    Cutoff: 25  74

 

 Norhydrocodone, Ur  Not Detected ng/mL    Cutoff: 25  75

 

 Dihydrocodeine, Ur  Not Detected ng/mL    Cutoff: 25  76

 

 Hydromorphone, Ur  Not Detected ng/mL    Cutoff: 25  77

 

 Zlyaaezfqhiom2orxesmgrhjqbmje  Not Detected ng/mL      78

 

 Oxycodone, Ur  Present ng/mL    Cutoff: 25  79

 

 Noroxycodone, Ur  Present ng/mL    Cutoff: 25  80

 

 Oxymorphone, Ur  Present ng/mL    Cutoff: 25  81

 

 Oxymorphone-3-beta-glucuronide  Present ng/mL      82

 

 Noroxymorphone, Ur  Present ng/mL    Cutoff: 25  83

 

 Fentanyl, Ur  Not Detected ng/mL    Cutoff: 2  84

 

 Norfentanyl, Ur  Not Detected ng/mL    Cutoff: 2  85

 

 Meperidine, Ur  Not Detected ng/mL    Cutoff: 25  86

 

 Normeperidine, Ur  Not Detected ng/mL    Cutoff: 25  87

 

 Naloxone, Ur  Not Detected ng/mL    Cutoff: 25  88

 

 Naloxone-3-beta-glucuronide, U  Not Detected ng/mL      89

 

 Methadone, Ur  Not Detected ng/mL    Cutoff: 25  90

 

 Eddp, Ur  Not Detected ng/mL    Cutoff: 25  91

 

 Propoxyphene, Ur  Not Detected ng/mL    Cutoff: 25  92

 

 Norpropoxyphene, Ur  Not Detected ng/mL    Cutoff: 25  93

 

 Tramadol, Ur  Not Detected ng/mL    Cutoff: 25  94

 

 O-desmethyltramadol, Ur  Not Detected ng/mL    Cutoff: 25  95

 

 Tapentadol, Ur  Not Detected ng/mL    Cutoff: 25  96

 

 N-desmethyltapentadol, Ur  Not Detected ng/mL    Cutoff: 50  97

 

 Tapentadol-beta-glucuronide, U  Not Detected ng/mL      98

 

 Buprenorphine, Ur  Not Detected ng/mL    Cutoff: 5  99

 

 Norbuprenorphine, Ur  Not Detected ng/mL    Cutoff: 5  100

 

 Norbuprenorphine glucuronide  Not Detected ng/mL    Cutoff: 20  101

 

 Opioid Interpretation  See Comment      102









 CBC Auto Diff  2016  White Blood Count  5.0 10^3/uL    3.5-10.8  









 Red Blood Count  4.76 10^6/uL    4.0-5.4  

 

 Hemoglobin  15.8 g/dL    14.0-18.0  

 

 Hematocrit  46 %    42-52  

 

 Mean Corpuscular Volume  97 fL  High  80-94  

 

 Mean Corpuscular Hemoglobin  33 pg  High  27-31  

 

 Mean Corpuscular HGB Conc  34 g/dL    31-36  

 

 Red Cell Distribution Width  14 %    10.5-15  

 

 Platelet Count  116 10^3/uL  Low  150-450  

 

 Mean Platelet Volume  10 um3    7.4-10.4  

 

 Abs Neutrophils  3.4 10^3/uL    1.5-7.7  

 

 Abs Lymphocytes  1.1 10^3/uL    1.0-4.8  

 

 Abs Monocytes  0.4 10^3/uL    0-0.8  

 

 Abs Eosinophils  0.1 10^3/uL    0-0.6  

 

 Abs Basophils  0 10^3/uL    0-0.2  

 

 Abs Nucleated RBC  0 10^3/uL      

 

 Granulocyte %  67.3 %    38-83  

 

 Lymphocyte %  22.7 %  Low  25-47  

 

 Monocyte %  7.1 %    1-9  

 

 Eosinophil %  2.3 %    0-6  

 

 Basophil %  0.6 %    0-2  

 

 Nucleated Red Blood Cells %  0.1      









 Laboratory test finding  2016  Surgical Pathology  SEE RESULT BELOW      
103

 

 Nicotine/Cotinine Serum  2016  Nicotine  <3.0 ng/mL    <3.0  









 Cotinine  <3.0 ng/mL    <3.0  104









 CBC No Diff  10/27/2016  White Blood Count  9.3 10^3/uL    3.5-10.8  105









 Red Blood Count  4.79 10^6/uL    4.0-5.4  105

 

 Hemoglobin  15.6 g/dL    14.0-18.0  105

 

 Hematocrit  46 %    42-52  105

 

 Mean Corpuscular Volume  96 fL  High  80-94  105

 

 Mean Corpuscular Hemoglobin  33 pg  High  27-31  105

 

 Mean Corpuscular HGB Conc  34 g/dL    31-36  105

 

 Red Cell Distribution Width  14 %    10.5-15  105

 

 Platelet Count  168 10^3/uL    150-450  105

 

 Mean Platelet Volume  9 um3    7.4-10.4  105









 Basic Metabolic Panel  10/27/2016  Sodium  139 mmol/L    133-145  105









 Potassium  3.9 mmol/L    3.5-5.0  105

 

 Chloride  102 mmol/L    101-111  105

 

 Co2 Carbon Dioxide  31 mmol/L    22-32  105

 

 Anion Gap  6 mmol/L    2-11  105

 

 Glucose  117 mg/dL  High    105

 

 Blood Urea Nitrogen  11 mg/dL    6-24  105

 

 Creatinine  0.80 mg/dL    0.67-1.17  105

 

 BUN/Creatinine Ratio  13.8    8-20  105

 

 Calcium  9.5 mg/dL    8.6-10.3  105

 

 Egfr Non-  112.0    >60  105

 

 Egfr   144.1    >60  105, 106









 Drug Abuse 20 Urine  10/10/2016  Urine Amphetamine  Presumptive Posi <SEE NOTE
>      107



       ng/mL      









 Urine Barbiturates  Negative ng/mL      108

 

 Urine Benzodiazepines  Negative ng/mL      109

 

 Urine Cocaine  Negative ng/mL      110

 

 Urine Phencyclidine  Negative ng/mL    Cutoff: 25  

 

 Urine Tetrahydrocannabinol  Negative ng/mL    Cutoff: 50  111

 

 Creatinine  237.1 mg/dL      

 

 Specific Gravity  1.019      

 

 pH  5.1      

 

 Oxidants  Negative      112

 

 Adulterants Comment  Normal      

 

 Codeine, Ur  Not Detected ng/mL    Cutoff: 25  113

 

 Codeine-6-beta-glucuronide, Ur  Not Detected ng/mL      114

 

 Morphine, Ur  Not Detected ng/mL    Cutoff: 25  115

 

 Morphine-6-beta-glucuronide, U  Not Detected ng/mL      116

 

 6-monoacetylmorphine, Ur  Not Detected ng/mL    Cutoff: 25  117

 

 Hydrocodone, Ur  Not Detected ng/mL    Cutoff: 25  118

 

 Norhydrocodone, Ur  Not Detected ng/mL    Cutoff: 25  119

 

 Dihydrocodeine, Ur  Not Detected ng/mL    Cutoff: 25  120

 

 Hydromorphone, Ur  Not Detected ng/mL    Cutoff: 25  121

 

 Ojptludikmcwu0gquitluyunqbsxx  Not Detected ng/mL      122

 

 Oxycodone, Ur  Present ng/mL    Cutoff: 25  123

 

 Noroxycodone, Ur  Present ng/mL    Cutoff: 25  124

 

 Oxymorphone, Ur  Not Detected ng/mL    Cutoff: 25  125

 

 Oxymorphone-3-beta-glucuronide  Present ng/mL      126

 

 Noroxymorphone, Ur  Present ng/mL    Cutoff: 25  127

 

 Fentanyl, Ur  Not Detected ng/mL    Cutoff: 2  128

 

 Norfentanyl, Ur  Not Detected ng/mL    Cutoff: 2  129

 

 Meperidine, Ur  Not Detected ng/mL    Cutoff: 25  130

 

 Normeperidine, Ur  Not Detected ng/mL    Cutoff: 25  131

 

 Naloxone, Ur  Not Detected ng/mL    Cutoff: 25  132

 

 Naloxone-3-beta-glucuronide, U  Not Detected ng/mL      133

 

 Methadone, Ur  Not Detected ng/mL    Cutoff: 25  134

 

 Eddp, Ur  Not Detected ng/mL    Cutoff: 25  135

 

 Propoxyphene, Ur  Not Detected ng/mL    Cutoff: 25  136

 

 Norpropoxyphene, Ur  Not Detected ng/mL    Cutoff: 25  137

 

 Tramadol, Ur  Not Detected ng/mL    Cutoff: 25  138

 

 O-desmethyltramadol, Ur  Not Detected ng/mL    Cutoff: 25  139

 

 Tapentadol, Ur  Not Detected ng/mL    Cutoff: 25  140

 

 N-desmethyltapentadol, Ur  Not Detected ng/mL    Cutoff: 50  141

 

 Tapentadol-beta-glucuronide, U  Not Detected ng/mL      142

 

 Buprenorphine, Ur  Not Detected ng/mL    Cutoff: 5  143

 

 Norbuprenorphine, Ur  Not Detected ng/mL    Cutoff: 5  144

 

 Norbuprenorphine glucuronide  Not Detected ng/mL    Cutoff: 20  145

 

 Opioid Interpretation  See Comment      146









 Urine Amphetamine  10/10/2016  Urine Amphetamine by  48155 ng/mL    Cutoff: 25
  



 Confirm    GC/MS        









 Urine Methamphetamine by GC/MS  Negative ng/mL    Cutoff: 25  

 

 Phentermine-by GC/MS  Negative ng/mL    Cutoff: 25  

 

 Pseudoephedrine/Ephedr GC/MS  Negative ng/mL    Cutoff: 25  

 

 Mda(Ecstacy metabolite) GC/MS  Negative ng/mL    Cutoff: 25  

 

 Mdma(Ecstacy)-by GC/MS  Negative ng/mL    Cutoff: 25  

 

 Urine Amphetamines Interp  Positive.      147









 Basic Metabolic Panel  2016  Sodium  138 mmol/L    133-145  









 Potassium  4.2 mmol/L    3.5-5.0  

 

 Chloride  105 mmol/L    101-111  

 

 Co2 Carbon Dioxide  26 mmol/L    22-32  

 

 Anion Gap  7 mmol/L    2-11  

 

 Glucose  114 mg/dL  High    

 

 Blood Urea Nitrogen  13 mg/dL    6-24  

 

 Creatinine  0.84 mg/dL    0.67-1.17  

 

 BUN/Creatinine Ratio  15.5    8-20  

 

 Calcium  8.8 mg/dL    8.6-10.3  

 

 Egfr Non-  105.9    >60  

 

 Egfr   136.2    >60  148









 Lipid Profile (Trig/Chol/HDL)  2016  Triglycerides  114 mg/dL      149









 Cholesterol  131 mg/dL      150

 

 HDL Cholesterol  29.1 mg/dL      151

 

 LDL Cholesterol  79 mg/dL      152









 Urine Amphetamine  2016  Urine Amphetamine by  81517 ng/mL    Cutoff: 25
  



 Confirm    GC/MS        









 Urine Methamphetamine by GC/MS  Negative ng/mL    Cutoff: 25  

 

 Phentermine-by GC/MS  Negative ng/mL    Cutoff: 25  

 

 Pseudoephedrine/Ephedr GC/MS  Negative ng/mL    Cutoff: 25  

 

 Mda(Ecstacy metabolite) GC/MS  Negative ng/mL    Cutoff: 25  

 

 Mdma(Ecstacy)-by GC/MS  Negative ng/mL    Cutoff: 25  

 

 Urine Amphetamines Interp  Positive.      153









 Drug Abuse 20 Urine  2016  Urine Amphetamine  Presumptive Posi <SEE NOTE
>      154



       ng/mL      









 Urine Barbiturates  Negative ng/mL      155

 

 Urine Benzodiazepines  Negative ng/mL      156

 

 Urine Cocaine  Negative ng/mL      157

 

 Urine Methadone  Negative ng/mL      158

 

 Urine Opiates  Negative ng/mL      159

 

 Urine Phencyclidine  Negative ng/mL    Cutoff: 25  

 

 Urine Tetrahydrocannabinol  Negative ng/mL    Cutoff: 20  160

 

 Urine Oxycodone  Presumptive Posi <SEE NOTE> ng/mL      161









 Oxycodone,Urine Quantitation  2016  Oxycodone  1190 ng/mL      162









 Oxymorphone  1900 ng/mL      163

 

 Oxycodone Interpretation  Positive.      164









 CBC Auto Diff  2016  White Blood Count  9.2 10^3/uL    3.5-10.8  









 Red Blood Count  5.09 10^6/uL    4.0-5.4  

 

 Hemoglobin  16.4 g/dL    14.0-18.0  

 

 Hematocrit  50 %    42-52  

 

 Mean Corpuscular Volume  98 fL  High  80-94  

 

 Mean Corpuscular Hemoglobin  32 pg  High  27-31  

 

 Mean Corpuscular HGB Conc  33 g/dL    31-36  

 

 Red Cell Distribution Width  14 %    10.5-15  

 

 Platelet Count  173 10^3/uL    150-450  

 

 Mean Platelet Volume  9 um3    7.4-10.4  

 

 Abs Neutrophils  6.1 10^3/uL    1.5-7.7  

 

 Abs Lymphocytes  2.2 10^3/uL    1.0-4.8  

 

 Abs Monocytes  0.7 10^3/uL    0-0.8  

 

 Abs Eosinophils  0.2 10^3/uL    0-0.6  

 

 Abs Basophils  0.1 10^3/uL    0-0.2  

 

 Abs Nucleated RBC  0.01 10^3/uL      

 

 Granulocyte %  66.3 %    38-83  

 

 Lymphocyte %  23.4 %  Low  25-47  

 

 Monocyte %  7.4 %    1-9  

 

 Eosinophil %  2.0 %    0-6  

 

 Basophil %  0.9 %    0-2  

 

 Nucleated Red Blood Cells %  0.1      









 Laboratory test finding  2016  Uric Acid  7.3 mg/dL    4.4-7.6  

 

 Basic Metabolic Panel  2016  Sodium  136 mmol/L    133-145  









 Potassium  4.7 mmol/L    3.5-5.0  

 

 Chloride  100 mmol/L  Low  101-111  

 

 Co2 Carbon Dioxide  31 mmol/L    22-32  

 

 Anion Gap  5 mmol/L    2-11  

 

 Glucose  95 mg/dL      

 

 Blood Urea Nitrogen  11 mg/dL    6-24  

 

 Creatinine  0.92 mg/dL    0.67-1.17  

 

 BUN/Creatinine Ratio  12.0    8-20  

 

 Calcium  9.3 mg/dL    8.6-10.3  

 

 Egfr Non-  96.0    >60  

 

 Egfr   123.4    >60  165









 Surgical Pathology  2013  S  RUN DATE: / <SEE NOTE>      166









 1  SEE RESULT BELOW



   -----------------------------------------------------------------------------
---------------



   Name:  ALLY MOORE                  : 1985    Attend Dr: Ally Good MD



   Acct:  [id number]  Unit: [id number]  AGE: 32            Location:  [place] [address]



   Re18      Dis:  18    SEX: M             Status:    DIS IN



   -----------------------------------------------------------------------------
---------------



   



   SPEC:               JERONIMO: 18-          SUBM DR: Ally Good MD



   REQ:  [id number]             RECD: 



   STATUS: SOUT



   _



   ORDERED:  LEVEL 5



   



   FINAL DIAGNOSIS



   



   



   Colon, sigmoid, partial resection:



   -- Severe diverticulosis with chronic diverticulitis.



   -- No significant acute inflammatory process identified.



   -- No evidence of neoplasia identified.



   



   



   



   PRE-OPERATIVE DIAGNOSIS



   



   Diverticulitis of large intestine without perforation



   



   GROSS DESCRIPTION



   



   The specimen is received in formalin labeled, Sigmoid Colon, and consists of 
a 22.4 x 4.7 cm



   unoriented portion of intestinal tissue with abundant adherent grey-yellow 
fat.  The serosa



   is smooth to shaggy tan-gray with mild focal fibromembranous adhesions.  
There are multiple



   diverticula throughout the specimen the majority of which are impacted with 
brown fecal



   material.  There is a hemorrhagic diverticulum, 4.8 cm from the nearest 
margin.  The mucosa



   is glistening tan-pink with normal folds.  Representative sections are 
submitted in



   cassettes A through E as follows: A-margins and B through E-diverticula to 
include



   hemorrhage in cassettes D and E.



   



   Signed __________(signature on file)___________ Jaime Vera MD  [id number]



   



   -----------------------------------------------------------------------------
---------------



   



   



   



   



   



   



   



   



   



   ** END OF REPORT **



   



   * ML=Testing performed at Main Lab



   DEPARTMENT OF PATHOLOGY,  [address] [place] [address]



   Phone # [phone]      Fax #[phone]



   Jaime Vera M.D. Director     [facility] # [id number]

 

 2  AA 

 

 3  *******Because ethnic data is not always readily available,



   this report includes an eGFR for both -Americans and



   non- Americans.****



   The National Kidney Disease Education Program (NKDEP) does



   not endorse the use of the MDRD equation for patients that



   are not between the ages of 18 and 70, are pregnant, have



   extremes of body size, muscle mass, or nutritional status,



   or are non- or non-.



   According to the National Kidney Foundation, irrespective of



   diagnosis, the stage of the disease is based on the level of



   kidney function:



   Stage Description                      GFR(mL/min/1.73 m(2))



   1     Kidney damage with normal or decreased GFR       90



   2     Kidney damage with mild decrease in GFR          60-89



   3     Moderate decrease in GFR                         30-59



   4     Severe decrease in GFR                           15-29



   5     Kidney failure                       <15 (or dialysis)

 

 4  



   FASTING

 

 5  



   FASTING

 

 6  Normal Range 180 to 914



   Indeterminate Range 145 to 180



   Deficient Range  <145

 

 7  



   FASTING

 

 8  -------------------ADDITIONAL INFORMATION-------------------



   This test was developed and its performance characteristics



   determined by [facility] in a manner consistent with CLIA



   requirements. This test has not been cleared or approved by



   the U.S. Food and Drug Administration.



   Test Performed by:



   [facility] [place] - [address] [address]

 

 9  -------------------ADDITIONAL INFORMATION-------------------



   This test was developed and its performance characteristics



   determined by [facility] in a manner consistent with CLIA



   requirements. This test has not been cleared or approved by



   the U.S. Food and Drug Administration.



   Test Performed by:



   [facility] - [address] [address]

 

 10  -------------------ADDITIONAL INFORMATION-------------------



   This report is intended for use in clinical monitoring and



   management of patients.  It is not intended for use in



   employment-related testing.



   This test was developed and its performance characteristics



   determined by [facility] in a manner consistent with CLIA



   requirements. This test has not been cleared or approved by



   the U.S. Food and Drug Administration.



   Test Performed by:



   [facility] - [address] [address]

 

 11  -------------------REFERENCE VALUE--------------------------



   Cutoff: 3.0

 

 12  -------------------ADDITIONAL INFORMATION-------------------



   This report is intended for use in clinical monitoring and



   management of patients.  It is not intended for use in



   employment-related testing.



   This test was developed and its performance characteristics



   determined by [facility] in a manner consistent with CLIA



   requirements. This test has not been cleared or approved by



   the U.[place]. Food and Drug Administration.



   Test Performed by:



   [facility] - [facility]



   [address] [place] [address]

 

 13  Presumptive Positive



   Drug confirmation to follow.  Presumptive Positive means



   that the screening method is positive, but the test needs



   to be run by a confirmatory method before being finalized.



   -------------------REFERENCE VALUE--------------------------



   Cutoff: 500

 

 14  -------------------REFERENCE VALUE--------------------------



   Cutoff: 200

 

 15  -------------------REFERENCE VALUE--------------------------



   Cutoff: 100

 

 16  -------------------REFERENCE VALUE--------------------------



   Cutoff: 150

 

 17  Presumptive Positive



   Drug confirmation to follow.  Presumptive Positive means



   that the screening method is positive, but the test needs



   to be run by a confirmatory method before being finalized.



   -------------------ADDITIONAL INFORMATION-------------------



   This report is intended for use in clinical monitoring or



   management of patients.  It is not intended for use in



   employment-related testing.

 

 18  -------------------REFERENCE VALUE--------------------------



   Cutoff: 200 mg/L

 

 19  Tylenol 3

 

 20  Metabolite of codeine



   -------------------REFERENCE VALUE--------------------------



   Cutoff: 100

 

 21  Sandhya Valladares, MS Contin; Also a minor metabolite (10%) of



   codeine and can be seen in low concentrations (<2,000



   ng/mL) with poppy seed ingestion.

 

 22  Metabolite of morphine



   -------------------REFERENCE VALUE--------------------------



   Cutoff: 100

 

 23  Metabolite of heroin

 

 24  Lortab, Norco, Vicodin; Also a very minor metabolite of



   codeine and impurity (<1%) of oxycodone.

 

 25  Metabolite of hydrocodone

 

 26  Metabolite of hydrocodone

 

 27  Dilaudid, Exalgo; Also a metabolite of hydrocodone and a



   minor (<5%) metabolite of morphine.

 

 28  Metabolite of hydromorphone



   -------------------REFERENCE VALUE--------------------------



   Cutoff: 100

 

 29  Endocet, Percocet, Oxycontin

 

 30  Metabolite of oxycodone

 

 31  Numorphan, Opana; Also a metabolite of oxycodone.

 

 32  Metabolite of oxymorphone



   -------------------REFERENCE VALUE--------------------------



   Cutoff: 100

 

 33  Metabolite of oxymorphone

 

 34  Actiq, Duragesic, Fentora

 

 35  Metabolite of fentanyl

 

 36  Demerol

 

 37  Metabolite of meperidine

 

 38  Narcan

 

 39  Metabolite of naloxone



   -------------------REFERENCE VALUE--------------------------



   Cutoff: 100

 

 40  Dolophine

 

 41  Metabolite of methadone

 

 42  Darvon, Darvocet

 

 43  Metabolite of propoxyphene

 

 44  Tradol, Ultram, Ultracet

 

 45  Metabolite of tramadol

 

 46  Nucynta

 

 47  Metabolite of tapentadol

 

 48  Metabolite of tapentadol



   -------------------REFERENCE VALUE--------------------------



   Cutoff: 100

 

 49  Buprenex, Suboxone

 

 50  Metabolite of buprenorphine

 

 51  Metabolite of buprenorphine

 

 52  Test detected the presence of oxycodone and several



   metabolites (noroxycodone, noroxymorphone, and



   oxymorphone-3-beta-glucuronide). Suspect use of oxycodone



   or possibly oxycodone and oxymorphone within the past three



   days.



   -------------------ADDITIONAL INFORMATION-------------------



   This test was developed and its performance characteristics



   determined by [facility] in a manner consistent with CLIA



   requirements. This test has not been cleared or approved by



   the U.S. Food and Drug Administration.



   Test Performed by:



   [facility] - [address] [address]

 

 53  [facility] Severe Sepsis and Septic Shock Management Bundle Measure



   requires all lactic acids initially measuring >2.0 mmol/L be



   repeated.

 

 54  *******Because ethnic data is not always readily available,



   this report includes an eGFR for both -Americans and



   non- Americans.****



   The National Kidney Disease Education Program (NKDEP) does



   not endorse the use of the MDRD equation for patients that



   are not between the ages of 18 and 70, are pregnant, have



   extremes of body size, muscle mass, or nutritional status,



   or are non- or non-.



   According to the National Kidney Foundation, irrespective of



   diagnosis, the stage of the disease is based on the level of



   kidney function:



   Stage Description                      GFR(mL/min/1.73 m(2))



   1     Kidney damage with normal or decreased GFR       90



   2     Kidney damage with mild decrease in GFR          60-89



   3     Moderate decrease in GFR                         30-59



   4     Severe decrease in GFR                           15-29



   5     Kidney failure                       <15 (or dialysis)

 

 55  Acute inflammation:  >10.00

 

 56  *******Because ethnic data is not always readily available,



   this report includes an eGFR for both -Americans and



   non- Americans.****



   The National Kidney Disease Education Program (NKDEP) does



   not endorse the use of the MDRD equation for patients that



   are not between the ages of 18 and 70, are pregnant, have



   extremes of body size, muscle mass, or nutritional status,



   or are non- or non-.



   According to the National Kidney Foundation, irrespective of



   diagnosis, the stage of the disease is based on the level of



   kidney function:



   Stage Description                      GFR(mL/min/1.73 m(2))



   1     Kidney damage with normal or decreased GFR       90



   2     Kidney damage with mild decrease in GFR          60-89



   3     Moderate decrease in GFR                         30-59



   4     Severe decrease in GFR                           15-29



   5     Kidney failure                       <15 (or dialysis)

 

 57  Normal Range 180 to 914



   Indeterminate Range 145 to 180



   Deficient Range  <145

 

 58  -------------------ADDITIONAL INFORMATION-------------------



   This test was developed and its performance characteristics



   determined by [facility] in a manner consistent with CLIA



   requirements. This test has not been cleared or approved by



   the U.S. Food and Drug Administration.



   Test Performed by:



   [facility] - [address] [address]

 

 59  -------------------ADDITIONAL INFORMATION-------------------



   This test was developed and its performance characteristics



   determined by [facility] in a manner consistent with CLIA



   requirements. This test has not been cleared or approved by



   the U.S. Food and Drug Administration.



   Test Performed by:



   [facility] - [address] [address]

 

 60  -------------------ADDITIONAL INFORMATION-------------------



   This report is intended for use in clinical monitoring and



   management of patients.  It is not intended for use in



   employment-related testing.



   This test was developed and its performance characteristics



   determined by [facility] in a manner consistent with CLIA



   requirements. This test has not been cleared or approved by



   the U.S. Food and Drug Administration.



   Test Performed by:



   [facility] - [address] [address]

 

 61  -------------------REFERENCE VALUE--------------------------



   Cutoff: 3.0

 

 62  -------------------ADDITIONAL INFORMATION-------------------



   This report is intended for use in clinical monitoring and



   management of patients.  It is not intended for use in



   employment-related testing.



   This test was developed and its performance characteristics



   determined by [facility] in a manner consistent with CLIA



   requirements. This test has not been cleared or approved by



   the U.S. Food and Drug Administration.



   Test Performed by:



   [facility] - [address] [address]

 

 63  Presumptive Positive



   Drug confirmation to follow.  Presumptive Positive means



   that the screening method is positive, but the test needs



   to be run by a confirmatory method before being finalized.



   -------------------REFERENCE VALUE--------------------------



   Cutoff: 500

 

 64  -------------------REFERENCE VALUE--------------------------



   Cutoff: 200

 

 65  -------------------REFERENCE VALUE--------------------------



   Cutoff: 100

 

 66  -------------------REFERENCE VALUE--------------------------



   Cutoff: 150

 

 67  Presumptive Positive



   Drug confirmation to follow.  Presumptive Positive means



   that the screening method is positive, but the test needs



   to be run by a confirmatory method before being finalized.



   -------------------ADDITIONAL INFORMATION-------------------



   This report is intended for use in clinical monitoring or



   management of patients.  It is not intended for use in



   employment-related testing.

 

 68  -------------------REFERENCE VALUE--------------------------



   Cutoff: 200 mg/L

 

 69  Tylenol 3

 

 70  Metabolite of codeine



   -------------------REFERENCE VALUE--------------------------



   Cutoff: 100

 

 71  Sandhya Valladares, MS Contin; Also a minor metabolite (10%) of



   codeine and can be seen in low concentrations (<2,000



   ng/mL) with poppy seed ingestion.

 

 72  Metabolite of morphine



   -------------------REFERENCE VALUE--------------------------



   Cutoff: 100

 

 73  Metabolite of heroin

 

 74  Lortab, Norco, Vicodin; Also a very minor metabolite of



   codeine and impurity (<1%) of oxycodone.

 

 75  Metabolite of hydrocodone

 

 76  Metabolite of hydrocodone

 

 77  Dilaudid, Exalgo; Also a metabolite of hydrocodone and a



   minor (<5%) metabolite of morphine.

 

 78  Metabolite of hydromorphone



   -------------------REFERENCE VALUE--------------------------



   Cutoff: 100

 

 79  Endocet, Percocet, Oxycontin

 

 80  Metabolite of oxycodone

 

 81  Numorphan, Opana; Also a metabolite of oxycodone.

 

 82  Metabolite of oxymorphone



   -------------------REFERENCE VALUE--------------------------



   Cutoff: 100

 

 83  Metabolite of oxymorphone

 

 84  Actiq, Duragesic, Fentora

 

 85  Metabolite of fentanyl

 

 86  Demerol

 

 87  Metabolite of meperidine

 

 88  Narcan

 

 89  Metabolite of naloxone



   -------------------REFERENCE VALUE--------------------------



   Cutoff: 100

 

 90  Dolophine

 

 91  Metabolite of methadone

 

 92  Darvon, Darvocet

 

 93  Metabolite of propoxyphene

 

 94  Tradol, Ultram, Ultracet

 

 95  Metabolite of tramadol

 

 96  Nucynta

 

 97  Metabolite of tapentadol

 

 98  Metabolite of tapentadol



   -------------------REFERENCE VALUE--------------------------



   Cutoff: 100

 

 99  Buprenex, Suboxone

 

 10  Metabolite of buprenorphine



 0  

 

 10  Metabolite of buprenorphine



 1  

 

 10  Test detected the presence of oxycodone and several



 2  metabolites (noroxycodone, oxymorphone, noroxymorphone, and



   oxymorphone-3-beta-glucuronide). Suspect use of oxycodone



   or possibly oxycodone and oxymorphone within the past three



   days.



   -------------------ADDITIONAL INFORMATION-------------------



   This test was developed and its performance characteristics



   determined by [facility] in a manner consistent with CLIA



   requirements. This test has not been cleared or approved by



   the U.S. Food and Drug Administration.



   Test Performed by:



   [facility] - [address] [address]

 

 10  SEE RESULT BELOW



 3  ----------------------------------------------------------------------------
----------------



   Name:  ALLY MOORE                  : 1985    Attend Dr: Ally Good MD



   Acct:  [id number]  Unit: [id number]  AGE: 31            Location:  [place] [address]



   Re16                        SEX: M             Status:    ADM IN



   -----------------------------------------------------------------------------
---------------



   



   SPEC: [id number]             JERONIMO: 16-          SUBM DR: Ally Good MD



   REQ:  [id number]             RECD: 



   STATUS: SOUT



   _



   ORDERED:  LEVEL V



   



   FINAL DIAGNOSIS



   



   



   Stomach, partial gastrectomy:



   -- Benign gastric tissue.



   



   



   



   PRE-OPERATIVE DIAGNOSIS



   



   Super morbid obesity.



   



   GROSS DESCRIPTION



   



   The specimen is received in formalin labeled, portion of stomach, and 
consists of a 16.5 by



   up to 8.0 x 3.0 cm focally disrupted portion of gastric tissue.  The serosa 
is smooth to



   shaggy tan-pink with multiple defects, a prominent staple line and a small 
amount of



   adherent yellow fat.  The mucosa is glistening tan-red with normal rugal 
folds.



   Representative sections, one cassette.



   



   Signed __________(signature on file)___________ January Ashley MD  [id number]



   



   -----------------------------------------------------------------------------
---------------



   



   



   



   



   



   



   



   



   



   



   



   



   



   



   ** END OF REPORT **



   



   * ML=Testing performed at Main Lab



   DEPARTMENT OF PATHOLOGY,  [address] [place] [address]



   Phone # [phone]      Fax #[phone]



   Jaime Vera M.D. Director     [facility] # [id number]

 

 10  -------------------ADDITIONAL INFORMATION-------------------



 4  This test was developed and its performance characteristics



   determined by [facility] in a manner consistent with CLIA



   requirements. This test has not been cleared or approved by



   the U.S. Food and Drug Administration.



   Test Performed by:



   [facility] [place] [address]



   : eLonardo Bray II, M.D., Ph.D.

 

 10  SURGERY 16 AA



 5  

 

 10  *******Because ethnic data is not always readily available,



 6  this report includes an eGFR for both -Americans and



   non- Americans.****



   The National Kidney Disease Education Program (NKDEP) does



   not endorse the use of the MDRD equation for patients that



   are not between the ages of 18 and 70, are pregnant, have



   extremes of body size, muscle mass, or nutritional status,



   or are non- or non-.



   According to the National Kidney Foundation, irrespective of



   diagnosis, the stage of the disease is based on the level of



   kidney function:



   Stage Description                      GFR(mL/min/1.73 m(2))



   1     Kidney damage with normal or decreased GFR       90



   2     Kidney damage with mild decrease in GFR          60-89



   3     Moderate decrease in GFR                         30-59



   4     Severe decrease in GFR                           15-29



   5     Kidney failure                       <15 (or dialysis)

 

 10  Presumptive Positive



 7  Drug confirmation to follow.  Presumptive Positive means



   that the screening method is positive, but the test needs



   to be run by a confirmatory method before being finalized.



   -------------------REFERENCE VALUE--------------------------



   Cutoff: 500

 

 10  -------------------REFERENCE VALUE--------------------------



 8  Cutoff: 200

 

 10  -------------------REFERENCE VALUE--------------------------



 9  Cutoff: 100

 

 11  -------------------REFERENCE VALUE--------------------------



 0  Cutoff: 150

 

 11  -------------------ADDITIONAL INFORMATION-------------------



 1  This report is intended for use in clinical monitoring or



   management of patients.  It is not intended for use in



   employment-related testing.

 

 11  -------------------REFERENCE VALUE--------------------------



 2  Cutoff: 200 mg/L

 

 11  Tylenol 3



 3  

 

 11  Metabolite of codeine



 4  -------------------REFERENCE VALUE--------------------------



   Cutoff: 100

 

 11  Avinza, Sandhya, MS Contin; Also a minor metabolite (10%) of



 5  codeine and can be seen in low concentrations (<2,000



   ng/mL) with poppy seed ingestion.

 

 11  Metabolite of morphine



 6  -------------------REFERENCE VALUE--------------------------



   Cutoff: 100

 

 11  Metabolite of heroin



 7  

 

 11  Lortab, Norco, Vicodin; Also a very minor metabolite of



 8  codeine and impurity (<1%) of oxycodone.

 

 11  Metabolite of hydrocodone



 9  

 

 12  Metabolite of hydrocodone



 0  

 

 12  Dilaudid, Exalgo; Also a metabolite of hydrocodone and a



 1  minor (<5%) metabolite of morphine.

 

 12  Metabolite of hydromorphone



 2  -------------------REFERENCE VALUE--------------------------



   Cutoff: 100

 

 12  Endocet, Percocet, Oxycontin



 3  

 

 12  Metabolite of oxycodone



 4  

 

 12  Numorphan, Opana; Also a metabolite of oxycodone.



 5  

 

 12  Metabolite of oxymorphone



 6  -------------------REFERENCE VALUE--------------------------



   Cutoff: 100

 

 12  Metabolite of oxymorphone



 7  

 

 12  Actiq, Duragesic, Fentora



 8  

 

 12  Metabolite of fentanyl



 9  

 

 13  Demerol



 0  

 

 13  Metabolite of meperidine



 1  

 

 13  Narcan



 2  

 

 13  Metabolite of naloxone



 3  -------------------REFERENCE VALUE--------------------------



   Cutoff: 100

 

 13  Dolophine



 4  

 

 13  Metabolite of methadone



 5  

 

 13  Darvon, Darvocet



 6  

 

 13  Metabolite of propoxyphene



 7  

 

 13  Tradol, Ultram, Ultracet



 8  

 

 13  Metabolite of tramadol



 9  

 

 14  Nucynta



 0  

 

 14  Metabolite of tapentadol



 1  

 

 14  Metabolite of tapentadol



 2  -------------------REFERENCE VALUE--------------------------



   Cutoff: 100

 

 14  Buprenex, Suboxone



 3  

 

 14  Metabolite of buprenorphine



 4  

 

 14  Metabolite of buprenorphine



 5  

 

 14  Test detected the presence of oxycodone and several



 6  metabolites (noroxycodone, noroxymorphone, and



   oxymorphone-3-beta-glucuronide). Suspect use of oxycodone



   or possibly oxycodone and oxymorphone within the past three



   days.



   -------------------ADDITIONAL INFORMATION-------------------



   This test was developed and its performance characteristics



   determined by [facility] in a manner consistent with CLIA



   requirements. This test has not been cleared or approved by



   the U.S. Food and Drug Administration.



   PDF Report available at: [email or web address]
[email or web address]



   Test Performed by:



   [facility] - [address] [address]



   : Leonardo Bray II, M.D., Ph.D.

 

 14  -------------------ADDITIONAL INFORMATION-------------------



 7  This report is intended for use in clinical monitoring and



   management of patients.  It is not intended for use in



   employment-related testing.



   Test Performed by:



   [facility] [place] [address]



   : Leonardo Bray II, M.D., Ph.D.

 

 14  *******Because ethnic data is not always readily available,



 8  this report includes an eGFR for both -Americans and



   non- Americans.****



   The National Kidney Disease Education Program (NKDEP) does



   not endorse the use of the MDRD equation for patients that



   are not between the ages of 18 and 70, are pregnant, have



   extremes of body size, muscle mass, or nutritional status,



   or are non- or non-.



   According to the National Kidney Foundation, irrespective of



   diagnosis, the stage of the disease is based on the level of



   kidney function:



   Stage Description                      GFR(mL/min/1.73 m(2))



   1     Kidney damage with normal or decreased GFR       90



   2     Kidney damage with mild decrease in GFR          60-89



   3     Moderate decrease in GFR                         30-59



   4     Severe decrease in GFR                           15-29



   5     Kidney failure                       <15 (or dialysis)

 

 14  Desirable <150



 9  Borderline high 150-199



   High 200-499



   Very High >500

 

 15  Desirable <200



 0  Borderline high 200-239



   High >239

 

 15  Low <40



 1  Desirable: 40-60



   High: >60

 

 15  Desirable: <100 mg/dL



 2  Near Optimal: 100-129 mg/dL



   Borderline High: 130-159 mg/dL



   High: 160-189 mg/dL



   Very High: >189 mg/dL

 

 15  -------------------ADDITIONAL INFORMATION-------------------



 3  This report is intended for use in clinical monitoring and



   management of patients.  It is not intended for use in



   employment-related testing.



   Test Performed by:



   [facility] - [address] [address]



   : Leonardo Bray II, M.D., Ph.D.

 

 15  Presumptive Positive



 4  Drug confirmation to follow.  Presumptive Positive means



   that the screening method is positive, but the test needs



   to be run by a confirmatory method before being finalized.



   -------------------REFERENCE VALUE--------------------------



   Cutoff: 500

 

 15  -------------------REFERENCE VALUE--------------------------



 5  Cutoff: 200

 

 15  -------------------REFERENCE VALUE--------------------------



 6  Cutoff: 200

 

 15  -------------------REFERENCE VALUE--------------------------



 7  Cutoff: 150

 

 15  -------------------REFERENCE VALUE--------------------------



 8  Cutoff: 150

 

 15  -------------------REFERENCE VALUE--------------------------



 9  Cutoff: 300

 

 16  -------------------ADDITIONAL INFORMATION-------------------



 0  This report is intended for use in clinical monitoring or



   management of patients.  It is not intended for use in



   employment-related testing.

 

 16  Presumptive Positive



 1  Drug confirmation to follow.  Presumptive Positive means



   that the screening method is positive, but the test needs



   to be run by a confirmatory method before being finalized.



   -------------------REFERENCE VALUE--------------------------



   Cutoff: 100



   -------------------ADDITIONAL INFORMATION-------------------



   This report is intended for use in clinical monitoring or



   management of patients.  It is not intended for use in



   employment-related testing.



   Test Performed by:



   [facility] [place] [address]



   : Leonardo Bray II, M.D., Ph.D.

 

 16  -------------------REFERENCE VALUE--------------------------



 2  Cutoff: 100

 

 16  -------------------REFERENCE VALUE--------------------------



 3  Cutoff: 100

 

 16  -------------------ADDITIONAL INFORMATION-------------------



 4  This report is intended for use in clinical monitoring and



   management of patients.  It is not intended for use in



   employment-related testing.



   Test Performed by:



   [place] [address]



   : Leonardo Bray II, M.D., Ph.D.

 

 16  *******Because ethnic data is not always readily available,



 5  this report includes an eGFR for both -Americans and



   non- Americans.****



   The National Kidney Disease Education Program (NKDEP) does



   not endorse the use of the MDRD equation for patients that



   are not between the ages of 18 and 70, are pregnant, have



   extremes of body size, muscle mass, or nutritional status,



   or are non- or non-.



   According to the National Kidney Foundation, irrespective of



   diagnosis, the stage of the disease is based on the level of



   kidney function:



   Stage Description                      GFR(mL/min/1.73 m(2))



   1     Kidney damage with normal or decreased GFR       90



   2     Kidney damage with mild decrease in GFR          60-89



   3     Moderate decrease in GFR                         30-59



   4     Severe decrease in GFR                           15-29



   5     Kidney failure                       <15 (or dialysis)

 

 16  RUN DATE: 13               [facility] **LIVE**       
         PAGE    1



 6  RUN TIME:              [address] [place] [address]   [id number]



   Specimen Inquiry



   



   -----------------------------------------------------------------------------
---------------



   Name:  ALLY MOORE                  : 1985    Attend Dr: 
Nicole Nunez MD



   Acct:  [id number]  Unit: [id number]  AGE: 28            Location:  [facility]



   Re13                        SEX: M             Status:    REG [facility]



   -----------------------------------------------------------------------------
---------------



   



   SPEC: [id number]             JERONIMO: 13-          SUBM DR: Nicole Nunez MD



   REQ:  [id number]             RECD: 



   STATUS: SOUT



   _



   ORDERED:  Decal, LEVEL III



   



   FINAL DIAGNOSIS



   



   



   Left wrist bone, arthritis:



   Severe degenerative osteoarthritic changes and marked reactive bony 
remodeling.



   



   



   



   



   PRE-OPERATIVE DIAGNOSIS



   



   Arthritis left wrist



   



   GROSS DESCRIPTION



   



   The specimen is received in formalin labeled Ally Moore, Left Wrist 
Bone Arthritis, and



   consists of multiple portions of tan-white bone that in aggregate measure 
2.5 x 1.5 x 0.4



   cm.  Representative sections, one cassette after decal.



   



   MICROSCOPIC DESCRIPTION



   



   



   Signed __________(signature on file)___________ Jaime Vera MD  [id number]



   



   -----------------------------------------------------------------------------
---------------



   



   



   



   



   



   



   



   



   



   



   



   



   ** END OF REPORT **



   



   * ML=Testing performed at Main Lab



   DEPARTMENT OF PATHOLOGY,  [address] [place] [address]



   Phone # [phone]      Fax #[phone]



   Jaime Vera M.D. Director     [place] Permit  #[id number]







Procedures







 Date  CPT Code  Description  Status

 

 2018  63000  Laparscopy Surg Mobil Splenic Flexure Performed W/Part  
Completed



     Colectomy  

 

 2018  41486  Laparscopy Surg Mobil Splenic Flexure Performed W/Part  
Completed



     Colectomy  

 

 2018  21634  Laparoscopy,Colectomy,Partial  Completed



     W/Anastomsis,W/Coloproctostomy  

 

 2018  63107  Laparoscopy,Colectomy,Partial  Completed



     W/Anastomsis,W/Coloproctostomy  

 

 10/04/2017  08075  EKG, Interpretation Only  Completed

 

 2016  71544  Laparoscopy Surg Angie Restrict Procedure Longitudinal  
Completed



     Gastrectomy  

 

 2016  43171  Laparoscopy Surg Angie Restrict Procedure Longitudinal  
Completed



     Gastrectomy  

 

 10/27/2016  87838  EKG, Interpretation Only  Completed

 

 2016  21998  Polysomnography Sleep Staging 4+ Parameters W/Cpap  
Completed

 

 2016  65444  Pulmonary Function><Bronchodil  Completed

 

 2016  35620  Plethysmography Determination Lung Volumes & Per Airway  
Completed



     Resist  

 

 2016  59857  Diffusing Capacity  Completed

 

 2016  77297  Polysomnography Sleep Staging 4+ Parameters  Completed

 

 2016    Colonoscopy  Completed

 

 10/24/2013  60408  Rad Exam; Wrist Limited, 2 Views  Completed

 

 2013  79578  Rad Exam; Wrist Limited, 2 Views  Completed

 

 2013  26145  Short Arm Cast Application  Completed

 

 2013  14924  Rad Exam; Wrist Limited, 2 Views  Completed

 

 2013  44687  Arthrodesis Wrist Complete  Completed







Encounters







 Type  Date  Location  Provider  CPT E/M  Dx

 

 Office Visit  2018  2:20p  [facility]  Brannon Donahue, P  
[address]  F11.11



     - [address]      









 F14.14

 

 G89.4

 

 G47.419

 

 R11.0

 

 K21.9









 Office Visit  2017  8:30a  [facility]  Brian Garcia,  [address]  
K57.32



     Medicine - [address]  M.D.,FACP    









 G89.4

 

 R63.4









 Office Visit  2017  8:30a  [facility]  Brian Garcia,  
[address]506  G89.4



     - [address]  M.D.,FACP    









 K57.32

 

 Z23









 Office Visit  10/06/2017  4:00p  [facility]  Brian Garcia,  [address]99  
K57.32



     Medicine - [address] Kiel HOUSTON,FACP    









 M54.5

 

 M53.3









 Office Visit  10/05/2017 10:35a  [facility],  Hang Jacob MD  
99108  K57.32



     Hospitalists      









 G47.33

 

 I10









 Office Visit  10/04/2017 10:34a  [facility],  Hang Jacob MD  
12029  K57.32



     Hospitalists      









 G47.33

 

 I10









 Office Visit  10/03/2017 10:34a  [facility]  [address]80  
K57.32



     [facility], [facility]    









 G47.33

 

 I10









 Office Visit  10/02/2017 11:30a  [facility]  Ally Good MD  
35300  K57.32



     [facility]      

 

 Office Visit  2017  9:30a  [facility]  Ally Good MD  
54291  Z98.84



     [facility]      









 K57.32

 

 M53.3









 Office Visit  2017  9:45a  [facility]  Ally Good MD  
19866  K57.32



     [facility]      









 Z98.84









 Office Visit  2017  2:00p  [facility]  Brian Garcia,  
20588  K02.7



     - [address]  M.D.,FACP    









 G47.419

 

 H66.91

 

 M54.42









 Office Visit  2017  9:30a  [facility]  Ally Good MD  
98568  Z98.84



     [facility]      









 K57.32









 Office Visit  2017 11:00a  [facility]  Brian Garcia,  24575  
Z00.01



     Med[facility] - [address] Kiel HOUSTON,FACP    









 E66.01

 

 M54.42

 

 Z23









 Office Visit  2017 11:00a  [facility]  Ally Good MD  
18053  K57.32



     [facility]      

 

 Office Visit  2016  9:20a  [facility] -  Brian Garcia,  
81373  K57.32



     [address]  M.D.,FACP    









 F17.201

 

 G47.33









 Office Visit  2016  9:15a  [facility]  Naveen Gresham,  
37664  K57.32



     Suze HOUSTON    

 

 Office Visit  2016  9:00a  [facility]  Vinnie Sorenson MD,  
45361  K57.32



     [facility]  FACS    









 R10.32









 Office Visit  2016  3:40p  [facility]  Brian Garcia,  
32742  K57.32



     - Jerrod HOUSTON,FACP    









 E66.01

 

 Z23









 Office Visit  2016 11:20a  [facility] [place]  
[address]  M25.562



     [address]      

 

 Office Visit  2016  8:20a  [facility] -  [place], [place]  
149[address]  I10



     [address]      









 G47.33

 

 E66.01

 

 F17.210









 Office Visit  07/15/2016 10:40a  [facility] [place]  
[address]  I10



     [address]      









 R73.01

 

 G47.33

 

 E66.01

 

 F17.210









 Office Visit  2016  9:00a  [facility]  Yolande Momin,  
54622  G47.33



     [facility]  CLARISSA GRIMES, HEATHER-BC    









 E66.01

 

 F17.210









 Office Visit  2016  9:40a  [facility]  Brian Garcia,  77203  
K57.32



     Med[facility] - [address] Kiel HOUSTON,FACP    









 E66.01

 

 G47.33

 

 I10

 

 F17.210









 Office Visit  2016 11:00a  [facility]  Yolande Momin,  
28548  G47.33



     [facility]  CLARISSA GRIMES, FNSHABBIR-BC    









 Z72.0

 

 G47.419

 

 E66.01









 Office Visit  2016  4:00p  [facility]  Brian KIMBERLY [place],  [address]  
K57.32



     Medicine - [address]  M.DBrett,FACP    









 G47.33

 

 Z72.0









 Office Visit  2016 11:00a  [facility]  Kirsten Howard MD  
57352  G47.33



     [facility]      









 G47.419

 

 F17.210

 

 E66.01









 Office Visit  2016  4:20p  [facility]  Brian KIMBERLY Garcia,  [address]  
K57.32



     Medicine - [address]  M.KIMBERLY,FACP    









 G47.419

 

 F17.210

 

 Z23









 Office Visit  2015 11:50a  [facility]  BrianDulce Garcia,  [address]280  
K57.32



     Med[facility] - [address] Kiel PIEDRA.KIMBERLY,FACP    









 E66.01

 

 G47.419









 Office Visit  2014  1:45p  [facility]  Nieves Loo M.D.  
84353  842.00



     Of C.M.ABrett      

 

 Office Visit  2013  4:00p  [facility]  Nicole Nunez  59791  
715.14



     [facility].DBrett    

 

 Office Visit  2013 10:30a  [facility]  Nicole Nunez  26970  
274.00



     Of GUNJAN HOUSTON    









 715.14









 Office Visit  2013 10:15a  [facility]  Nicole Nunez,  
16218  274.00



     GUNJAN HOUSTON    

 

 Office Visit  2012  2:34p  [facility]  Radha Bazan,  22028  562.11



     Assoc[facility] Hospitalists  D.O.    









 278.01

 

 347.00

 

 305.1









 Office Visit  2012  2:34p  [facility],  Radha Bazan,  
57415  562.11



     Hospitalists  D.O.    









 278.01

 

 347.00









 Office Visit  2012  2:33p  [facility],  Radha Bazan,  
92253  562.11



     Hospitalists  D.O.    









 278.01

 

 305.1

 

 347.00









 Office Visit  2012  2:33p  [facility],  Radha Bazan  
30273  562.11



     Hospitalists  D.O.    









 278.01

 

 305.1

 

 347.00









 Office Visit  2012  2:33p  [facility],  Radha Lopezr,  
84937  562.11



     Hospitalists  D.O.    









 278.01

 

 305.1









 Office Visit  2012  2:32p  [facility],  Tiago Kat,  
55519  562.11



     Hospitalists  M.D.    









 278.01

 

 305.1

 

 347.00









 Office Visit  2011  1:30p  [facility]  Kush Holland,  
99616  847.2



     [facility]  M.DBrett    

 

 Office Visit  2011  1:30p  [facility]  Kush Holland,  
77601  847.2



     Suze  M.KIMBERLY    









 847.2









 Office Visit  2011  2:00p  [facility]  Son Roman M.D.  
01754  726.2



     C.M.A.      

 

 Office Visit  2011  9:15a  [facility]  Son Roman M.D.  
44020  726.2



     C.M.A.      

 

 Office Visit  2011 11:00a  [facility]  Son Roman M.D.  
06074  847.0



     C.M.A.      

 

 Office Visit  2011 10:15a  [facility]  Son Roman M.D.  
12464  723.4



     C.M.A.      

 

 Office Visit  2010  1:30p  [facility]  PRANAV Watkins  
73956  840.9



     C.M.A.      









 923.00









 Office Visit  2010  3:00p  [facility]  PRANAV Watkins  
66330  840.9



     C.M.A.      







Plan of Care

Future Appointment(s):2018  8:30 am - Brian Garcia M.D.,FACP at [facility] 
[facility] - [address]2018 - Brian Garcia M.D.,FACPF11.11 
Opioid abuse, in remissionComments: reviewed, patient not obtaining opiates 
through other providers. Continue counseling. UDS up to date.Prescribed 1 strip 
Suboxone 8-2mg twice daily.F14.14 Cocaine abuse with cocaine-induced mood 
disorderComments:Discussed discharge from [facility].F33.1 Major depressive 
disorder, recurrent, moderateComments:Prescribed Desvenlafaxine Succinate (
Pristiq) 25mg daily for symptoms.

## 2018-07-04 NOTE — RAD
INDICATION: Atraumatic neck pain



COMPARISON: None

 

TECHNIQUE: Routine five-view imaging was performed



FINDINGS: 



Bones: There are no acute bony findings. There are no significant osteoarthritic findings.



Craniocervical junction: The odontoid and atlantodental interval are normal.



Alignment: Normal



Disc spaces: The disc spaces are well-maintained



Soft tissues: The prevertebral soft tissues are normal.



IMPRESSION: NEGATIVE EXAMINATION.

## 2018-07-04 NOTE — ED
Neck Pain





- HPI Summary


HPI Summary: 


33-year-old male presents with neck pain for the past 3 days.  He states pain 

is greatest to left-sided neck radiating to left shoulder.  He states that it 

is radiating up to his head and causing a headache.  He also has been 

experience nauseous from the pain.  He states pain radiates down his back.  No 

weakness.  No numbness or tingling.  He has never had this before.  He is a 

resident at [employer].  Has been taking aspirin and Tylenol without relief.  No 

fevers.  No change in vision.  No blurry vision.  He rates pain feels ache.  

Has full range of motion his neck. no chest pain or SOB.  headache is in 

frontal region. not worst headache of life.





- History of Current Complaint


Chief Complaint: EDNeckComplaint


Stated Complaint: NECK/LT SHOULDER PAIN


Time Seen by Provider: 07/04/18 17:51


Pain Intensity: 8





- Allergies/Home Medications


Allergies/Adverse Reactions: 


 Allergies











Allergy/AdvReac Type Severity Reaction Status Date / Time


 


codeine Allergy  Difficulty Verified 07/04/18 17:43





   Breathing  














PMH/Surg Hx/FS Hx/Imm Hx


Endocrine/Hematology History: 


   Denies: Hx Anticoagulant Therapy, Hx Diabetes, Hx Sickle Cell Disease, Hx 

Thyroid Disease


Cardiovascular History: 


   Denies: Hx Congestive Heart Failure, Hx Hypertension, Hx Pacemaker/ICD, 

Other Cardiovascular Problems/Disorders


Respiratory History: Reports: Hx Asthma - SEASONAL, Hx Sleep Apnea


   Denies: Hx Chronic Obstructive Pulmonary Disease (COPD), Other Respiratory 

Problems/Disorders - patient denies


GI History: Reports: Hx Gastroesophageal Reflux Disease - ACID REFLUX, Other GI 

Disorders - HX OF DIVERTICULITIS-PRN PERCOCET FOR ABDOMINAL PAIN


   Denies: Hx Ulcer


 History: 


   Denies: Hx Dialysis, Hx Renal Disease, Other  Problems/Disorders - patient 

denies


Musculoskeletal History: Reports: Other Musculoskeletal History - back pain


   Denies: Hx Arthritis


Sensory History: Reports: Hx Contacts or Glasses - GLASSES


   Denies: Hx Hearing Aid


Opthamlomology History: Reports: Hx Contacts or Glasses - GLASSES


Neurological History: Reports: Other Neuro Impairments/Disorders - NARCOLEPSY 

CONTROL WITH ADDERALL


   Denies: Hx Dementia, Hx Seizures


Psychiatric History: Reports: Hx Depression, Other Psychiatric Issues/Disorders 

- NARCOLEPSY


   Denies: Hx Panic Disorder, Hx Substance Abuse





- Surgical History


Surgery Procedure, Year, and Place: BILATERAL MYRINGOTOMY WITH TUBES X 7,.  

CIRCUMCISION AT AGE 9,.  TONSILLECTOMY AND ADENOIDECTOMY AS A CHILD.  

COLONOSCOPY.  LEFT WRIST surgery 9/17/13 - hardware placed.  GASTRIC SLEEVE 

SURGERY NOV 2016


Hx Anesthesia Reactions: No





- Immunization History


Date of Tetanus Vaccine: PT STATES UNSURE


Date of Influenza Vaccine: NONE


Immunizations Up to Date: Yes


Infectious Disease History: No


Infectious Disease History: Reports: Hx Shingles - AS A CHILD


   Denies: Hx Clostridium Difficile, Hx Hepatitis, Hx Human Immunodeficiency 

Virus (HIV), Hx of Known/Suspected MRSA, Hx Tuberculosis, Hx Known/Suspected VRE

, Hx Known/Suspected VRSA, History Other Infectious Disease, Traveled Outside 

the [place] in Last 30 Days





- Family History


Known Family History: Positive: Diabetes - Father, Other - Father: 

diverticulitis, Crohn's


   Negative: Renal Disease, Seizure Disorder





- Social History


Alcohol Use: Rare


Substance Use Type: Reports: None


Smoking Status (MU): Former Smoker


Type: Cigarettes


Amount Used/How Often: 1 PPD X 10+ YEARS


Have You Smoked in the Last Year: Yes





Review of Systems


Negative: Fever


Negative: Chest Pain


Negative: Shortness Of Breath


Positive: Myalgia - neck pain


All Other Systems Reviewed And Are Negative: Yes





Physical Exam


Triage Information Reviewed: Yes


Vital Signs On Initial Exam: 


 Initial Vitals











Temp Pulse Resp BP Pulse Ox


 


 98.5 F   75   14   121/73   95 


 


 07/04/18 17:32  07/04/18 17:32  07/04/18 17:32  07/04/18 17:32  07/04/18 17:32











Vital Signs Reviewed: Yes


Appearance: Positive: Well-Appearing


Skin: Positive: Warm, Dry


Head/Face: Positive: Normal Head/Face Inspection


Eyes: Positive: Normal, EOMI, JAMILA, Conjunctiva Clear, Other: - normal 

fundoscopic exam


ENT: Positive: Normal ENT inspection, Pharynx normal, TMs normal


Neck: Positive: Other: - tenderness across neck


Respiratory/Lung Sounds: Positive: Clear to Auscultation, Breath Sounds Present


Cardiovascular: Positive: Normal, RRR


Musculoskeletal: Positive: Strength/ROM Intact - upper and lower extremities, 

Other - good pulses


Neurological: Positive: Normal


Psychiatric: Positive: Normal





Diagnostics





- Vital Signs


 Vital Signs











  Temp Pulse Resp BP Pulse Ox


 


 07/04/18 17:32  98.5 F  75  14  121/73  95














- Laboratory


Lab Statement: Any lab studies that have been ordered have been reviewed, and 

results considered in the medical decision making process.





Neck Course/Dx





- Course


Course Of Treatment: 33-year-old male presents with neck pain for the past 3 

days.  He states pain is greatest to left-sided neck radiating to left 

shoulder.  He states that it is radiating up to his head and causing a 

headache.  He also has been experience nauseous from the pain.  He states pain 

radiates down his back.  No weakness.  No numbness or tingling.  He has never 

had this before.  Has been taking aspirin and Tylenol without relief.  No 

fevers.  No change in vision.  No blurry vision.  He rates pain feels ache.  

Has full range of motion his neck. no chest pain or SOB.  on exam tenderness 

across neck. neurovascular intact. xray normal. will treat with muscle relaxer. 

patient understand and agrees with plan.





- Diagnoses


Differential Dx/HQI/PQRI: Positive: Arthritis, Sprain, Strain


Provider Diagnoses: 


 Neck pain








Discharge





- Sign-Out/Discharge


Documenting (check all that apply): Discharge/Admit/Transfer





- Discharge Plan


Condition: Good


Disposition: HOME


Prescriptions: 


Cyclobenzaprine TAB* [Flexeril 10 MG TAB*] 10 mg PO TID PRN #12 tab


 PRN Reason: Pain


Patient Education Materials:  Neck Pain (ED)


Referrals: 


Brian Garcia MD [Primary Care Provider] - 


Additional Instructions: 


Take muscle relaxers three times a day 


Use ibuprofen or Tylenol for pain every 6 hours


ice/heat area, move as much as possible 


Follow up with primary within 5 days


Return to ED if develop any new or worsening symptoms





- Billing Disposition and Condition


Condition: GOOD


Disposition: Home